# Patient Record
Sex: MALE | Race: WHITE | NOT HISPANIC OR LATINO | Employment: OTHER | URBAN - METROPOLITAN AREA
[De-identification: names, ages, dates, MRNs, and addresses within clinical notes are randomized per-mention and may not be internally consistent; named-entity substitution may affect disease eponyms.]

---

## 2017-02-27 ENCOUNTER — TELEPHONE (OUTPATIENT)
Dept: RHEUMATOLOGY | Facility: CLINIC | Age: 52
End: 2017-02-27

## 2017-02-27 NOTE — TELEPHONE ENCOUNTER
----- Message from Mirza Jordan sent at 2/27/2017  2:02 PM CST -----  Contact: Patient's wife  Patient needs a sooner appointment than the scheduled appointment for 05/05/2017 due to wrist, elbow, knees, and thumbs are swelling and patient can barely move. A knot has recently formed between the patient's thumb and wrist. Please call patient at [phone] to schedule. Thanks!

## 2017-02-27 NOTE — TELEPHONE ENCOUNTER
Spoke with pt wife and informed no sooner appt available at this time. Pt placed on waiting list. Verbalized understanding.

## 2017-05-05 ENCOUNTER — OFFICE VISIT (OUTPATIENT)
Dept: RHEUMATOLOGY | Facility: CLINIC | Age: 52
End: 2017-05-05
Payer: MEDICARE

## 2017-05-05 VITALS
BODY MASS INDEX: 25.94 KG/M2 | HEART RATE: 70 BPM | HEIGHT: 72 IN | SYSTOLIC BLOOD PRESSURE: 139 MMHG | DIASTOLIC BLOOD PRESSURE: 88 MMHG | WEIGHT: 191.5 LBS

## 2017-05-05 DIAGNOSIS — M25.539 PAIN IN WRIST, UNSPECIFIED LATERALITY: Primary | ICD-10-CM

## 2017-05-05 DIAGNOSIS — D64.9 ANEMIA, UNSPECIFIED TYPE: ICD-10-CM

## 2017-05-05 DIAGNOSIS — M25.439 WRIST SWELLING, UNSPECIFIED LATERALITY: ICD-10-CM

## 2017-05-05 DIAGNOSIS — M25.569 KNEE PAIN, UNSPECIFIED CHRONICITY, UNSPECIFIED LATERALITY: ICD-10-CM

## 2017-05-05 PROCEDURE — 1160F RVW MEDS BY RX/DR IN RCRD: CPT | Mod: S$GLB,,, | Performed by: INTERNAL MEDICINE

## 2017-05-05 PROCEDURE — 99999 PR PBB SHADOW E&M-EST. PATIENT-LVL IV: CPT | Mod: PBBFAC,,, | Performed by: INTERNAL MEDICINE

## 2017-05-05 PROCEDURE — 99205 OFFICE O/P NEW HI 60 MIN: CPT | Mod: S$GLB,,, | Performed by: INTERNAL MEDICINE

## 2017-05-05 RX ORDER — MELOXICAM 15 MG/1
15 TABLET ORAL DAILY
Qty: 30 TABLET | Refills: 4 | Status: SHIPPED | OUTPATIENT
Start: 2017-05-05 | End: 2017-09-21 | Stop reason: SDUPTHER

## 2017-05-05 RX ORDER — METHYLPREDNISOLONE 4 MG/1
8 TABLET ORAL DAILY
Qty: 20 TABLET | Refills: 1 | Status: SHIPPED | OUTPATIENT
Start: 2017-05-05 | End: 2017-05-15

## 2017-05-05 NOTE — MR AVS SNAPSHOT
[facility]  [address] [facility] [address]  [facility] [address]  Phone: [phone]  Fax: [phone]                  Lokesh Melchor   2017 9:00 AM   Office Visit    Description:  Male : 1965   Provider:  Jerald Velazquez MD   Department:  [place] - Rheumatology           Reason for Visit     Pain           Diagnoses this Visit        Comments    Pain in wrist, unspecified laterality    -  Primary     Knee pain, unspecified chronicity, unspecified laterality         Wrist swelling, unspecified laterality         Anemia, unspecified type                To Do List           Future Appointments        Provider Department Dept Phone    2017 11:00 AM [facility] [id number] [facility] [phone]    10/12/2017 8:30 AM Jerald Velazquez MD [facility] [phone]      Goals (5 Years of Data)     None      Follow-Up and Disposition     Return in about 3 months (around 2017).       These Medications        Disp Refills Start End    meloxicam (MOBIC) 15 MG tablet 30 tablet 4 2017     Take 1 tablet (15 mg total) by mouth once daily. - Oral    Pharmacy: [facility] [address] [place] [address] AT [facility] [address] & [facility] [address] Ph #: [phone]       methylPREDNISolone (MEDROL) 4 MG Tab 20 tablet 1 2017 5/15/2017    Take 2 tablets (8 mg total) by mouth once daily. - Oral    Pharmacy: [facility] [address] [place] [address] AT [facility] [address] & [facility] [address] Ph #: [phone]         [facility]     [facility] Nurse Care Line -  Assistance  Unless otherwise directed by your provider, please contact [facility], our nurse care line that is available for  assistance.     Registered nurses in the [facility] provide: appointment scheduling, clinical advisement, health education, and other advisory services.  Call: [phone] (toll free)               Medications           Message  regarding Medications     Verify the changes and/or additions to your medication regime listed below are the same as discussed with your clinician today.  If any of these changes or additions are incorrect, please notify your healthcare provider.        START taking these NEW medications        Refills    meloxicam (MOBIC) 15 MG tablet 4    Sig: Take 1 tablet (15 mg total) by mouth once daily.    Class: Normal    Route: Oral    methylPREDNISolone (MEDROL) 4 MG Tab 1    Sig: Take 2 tablets (8 mg total) by mouth once daily.    Class: Normal    Route: Oral      STOP taking these medications     tamsulosin (FLOMAX) 0.4 mg Cp24 Take 1 capsule by mouth once daily.           Verify that the below list of medications is an accurate representation of the medications you are currently taking.  If none reported, the list may be blank. If incorrect, please contact your healthcare provider. Carry this list with you in case of emergency.           Current Medications     AMPHETAMINE SALT COMBO 30 MG tablet Take 1 tablet by mouth once daily.    meloxicam (MOBIC) 15 MG tablet Take 1 tablet (15 mg total) by mouth once daily.    methylPREDNISolone (MEDROL) 4 MG Tab Take 2 tablets (8 mg total) by mouth once daily.           Clinical Reference Information           Your Vitals Were     BP Pulse Height Weight BMI    139/88 70 6' (1.829 m) 86.9 kg (191 lb 8 oz) 25.97 kg/m2      Blood Pressure          Most Recent Value    BP  139/88      Allergies as of 5/5/2017     Codeine      Immunizations Administered on Date of Encounter - 5/5/2017     None      Orders Placed During Today's Visit     Future Labs/Procedures Expected by Expires    Aldolase  5/5/2017 7/4/2018    YESY  5/5/2017 7/4/2018    Anti Sm/RNP Antibody  5/5/2017 7/4/2018    Anti-DNA antibody, double-stranded  5/5/2017 7/4/2018    Anti-Histone Antibody  5/5/2017 7/4/2018    Anti-scleroderma antibody  5/5/2017 7/4/2018    Anti-Smith antibody  5/5/2017 7/4/2018    B. burgdorferi Abs  (Lyme Disease)  5/5/2017 7/4/2018    C-reactive protein  5/5/2017 7/4/2018    Celiac Disease Panel  5/5/2017 7/4/2018    CK  5/5/2017 7/4/2018    HLA B27 Antigen  5/5/2017 7/4/2018    LYME DISEASE WESTERN BLOT  5/5/2017 7/4/2018    METHYLENETETRAHYDROFOL GENOTYPING (C677T/M1981A)  5/5/2017 7/4/2018    Rheumatoid factor  5/5/2017 7/4/2018    Sedimentation rate, manual  5/5/2017 7/4/2018    Sjogrens syndrome-A extractable nuclear antibody  5/5/2017 7/4/2018    Sjogrens syndrome-B extractable nuclear antibody  5/5/2017 7/4/2018    T3, free  5/5/2017 7/4/2018    T4, free  5/5/2017 7/4/2018    TSH  5/5/2017 7/4/2018    X-Ray Hand Complete Bilateral  5/5/2017 5/5/2018    X-Ray Hand Complete Bilateral  5/5/2017 5/5/2018    X-Ray Wrist Complete Bilateral  5/5/2017 5/5/2018    X-Ray Wrist Complete Bilateral  5/5/2017 5/5/2018      [facility] Sign-Up     Activating your [facility] account is as easy as 1-2-3!     1) Visit [email or web address], select Sign Up Now, enter this activation code and your date of birth, then select Next.  [email or web address]  Expires: 6/19/2017 10:55 AM      2) Create a username and password to use when you visit [facility] in the future and select a security question in case you lose your password and select Next.    3) Enter your e-mail address and click Sign Up!    Additional Information  If you have questions, please e-mail [email or web address] or call [phone] to talk to our [facility] staff. Remember, [facility] is NOT to be used for urgent needs. For medical emergencies, dial 911.         Language Assistance Services     ATTENTION: Language assistance services are available, free of charge. Please call [phone].      ATENCIÓN: Si habla español, tiene a lux disposición servicios gratuitos de asistencia lingüística. Llame al [phone].     BETTINA Ý: N?u b?n nói Ti?ng Vi?t, có các d?ch v? h? tr? ngôn ng? mi?n phí dành cho b?n. G?i s? [phone].         [facility] complies with  applicable Federal civil rights laws and does not discriminate on the basis of race, color, national origin, age, disability, or sex.

## 2017-05-05 NOTE — PROGRESS NOTES
Subjective:       Patient ID: Lokesh Melchor is a 52 y.o. male.    Chief Complaint: Pain (pain in wrists)    HPI Comments: hpi he had a accident hurt his back x was treated  Steroid injections and pain meds, presently on low dose suboxone,  Wrist pain and knee pain had  Nodule removed, now x 1 year has joints.          Arthritis    The disease course has been fluctuating.     He complains of pain, stiffness, joint swelling and joint warmth. The symptoms have been worsening. Affected locations include the neck, right wrist, right MCP, right PIP, right DIP, left wrist, left MCP, left PIP and left DIP. Associated symptoms include fatigue, pain at night and pain while resting. Pertinent negatives include no dysuria, fever, Raynaud's syndrome, uveitis, trouble swallowing, myalgias, dry eyes, pleurisy, headaches, jaw pain, facial pain or weight loss. He complains of morning stiffness.The neck, left wrist, right wrist, right knee, left knee, left hand and right hand presents with Arthralgia.    Factors aggravating his arthritis include activity.     Review of Systems   Constitutional: Positive for fatigue. Negative for activity change, appetite change, chills, diaphoresis, fever, unexpected weight change and weight loss.   HENT: Negative for congestion, ear pain, facial swelling, mouth sores, nosebleeds, postnasal drip, rhinorrhea, sinus pressure, sneezing, sore throat, tinnitus, trouble swallowing and voice change.    Eyes: Negative for pain, discharge, redness, itching and visual disturbance.   Respiratory: Negative for apnea, cough, chest tightness, shortness of breath and wheezing.    Cardiovascular: Negative for chest pain, palpitations and leg swelling.   Gastrointestinal: Negative for abdominal pain, constipation, diarrhea, nausea and vomiting.   Endocrine: Negative for cold intolerance, heat intolerance, polydipsia and polyuria.   Genitourinary: Negative for decreased urine volume, difficulty urinating,  dysuria, flank pain, frequency, hematuria and urgency.   Musculoskeletal: Positive for back pain, gait problem, joint swelling, neck pain, neck stiffness and stiffness. Negative for arthralgias and myalgias.   Skin: Positive for rash. Negative for pallor and wound.   Allergic/Immunologic: Negative for immunocompromised state.   Neurological: Negative for dizziness, tremors, seizures, syncope, weakness, numbness and headaches.   Hematological: Negative for adenopathy. Does not bruise/bleed easily.   Psychiatric/Behavioral: Negative for sleep disturbance and suicidal ideas. The patient is not nervous/anxious.          Objective:   /88  Pulse 70  Ht 6' (1.829 m)  Wt 86.9 kg (191 lb 8 oz)  BMI 25.97 kg/m2     Physical Exam   Vitals reviewed.  Constitutional: He is oriented to person, place, and time and well-developed, well-nourished, and in no distress.   HENT:   Head: Normocephalic and atraumatic.   Mouth/Throat: Oropharynx is clear and moist.   Eyes: EOM are normal. Pupils are equal, round, and reactive to light.   Neck: Neck supple. No thyromegaly present.   Cardiovascular: Normal rate, regular rhythm and normal heart sounds.  Exam reveals no gallop and no friction rub.    No murmur heard.  Pulmonary/Chest: Breath sounds normal. He has no wheezes. He has no rales. He exhibits no tenderness.   Abdominal: There is no tenderness. There is no rebound and no guarding.       Right Side Rheumatological Exam     Examination finds the shoulder, elbow, wrist, knee, 1st PIP, 1st MCP, 2nd PIP, 2nd MCP, 3rd PIP, 3rd MCP, 4th PIP, 4th MCP, 5th PIP and 5th MCP normal.    Shoulder Exam   Tenderness Location: no tenderness    Range of Motion   Active Abduction: abnormal   Adduction: abnormal  Effusion: negative  Sensation: normal    Knee Exam   Patellofemoral Crepitus: negative  Effusion: negative  Range of Motion: normal range of motion  Sensation: normal    Hip Exam   Tenderness Location: no tenderness  Sensation:  normal    Elbow/Wrist Exam   Tenderness Location: no tenderness  Sensation: normal    Left Side Rheumatological Exam     Examination finds the shoulder, elbow, wrist, knee, 1st PIP, 1st MCP, 2nd PIP, 2nd MCP, 3rd PIP, 3rd MCP, 4th PIP, 4th MCP, 5th PIP and 5th MCP normal.    Shoulder Exam   Tenderness Location: no tenderness    Range of Motion   Active Abduction: abnormal   Sensation: normal    Knee Exam     Patellofemoral Crepitus: negative  Effusion: negative  Range of Motion: decreased range of motion  Sensation: normal    Hip Exam   Tenderness Location: no tenderness  Sensation: normal    Elbow/Wrist Exam   Sensation: normal      Back/Neck Exam   General Inspection   Gait: normal         Lymphadenopathy:     He has no cervical adenopathy.   Neurological: He is alert and oriented to person, place, and time. Gait normal.   Skin: No rash noted. No erythema. No pallor.     Psychiatric: Mood and affect normal.   Musculoskeletal: He exhibits tenderness and deformity.   oa and mcp, changes           Assessment:       1. Pain in wrist, unspecified laterality    2. Knee pain, unspecified chronicity, unspecified laterality    3. Wrist swelling, unspecified laterality    4. Anemia, unspecified type            Plan:       Lokesh was seen today for pain.    Diagnoses and all orders for this visit:    Pain in wrist, unspecified laterality  -     YESY; Future  -     Aldolase; Future  -     Anti Sm/RNP Antibody; Future  -     Anti-DNA antibody, double-stranded; Future  -     Anti-Histone Antibody; Future  -     Anti-scleroderma antibody; Future  -     Anti-Smith antibody; Future  -     Sjogrens syndrome-A extractable nuclear antibody; Future  -     Sjogrens syndrome-B extractable nuclear antibody; Future  -     C-reactive protein; Future  -     CK; Future  -     TSH; Future  -     T4, free; Future  -     T3, free; Future  -     Sedimentation rate, manual; Future  -     Rheumatoid factor; Future  -     HLA B27 Antigen; Future  -      LYME DISEASE WESTERN BLOT; Future  -     B. burgdorferi Abs (Lyme Disease); Future  -     Celiac Disease Panel; Future  -     X-Ray Hand Complete Bilateral; Future  -     X-Ray Wrist Complete Bilateral; Future  -     YESY  -     Aldolase  -     Anti Sm/RNP Antibody  -     Anti-DNA antibody, double-stranded  -     Anti-Histone Antibody  -     Anti-scleroderma antibody  -     Anti-Smith antibody  -     Sjogrens syndrome-A extractable nuclear antibody  -     Sjogrens syndrome-B extractable nuclear antibody  -     C-reactive protein  -     CK  -     TSH  -     T4, free  -     T3, free  -     Sedimentation rate, manual  -     Rheumatoid factor  -     HLA B27 Antigen  -     LYME DISEASE WESTERN BLOT  -     B. burgdorferi Abs (Lyme Disease)  -     Celiac Disease Panel  -     X-Ray Hand Complete Bilateral  -     X-Ray Wrist Complete Bilateral  -     meloxicam (MOBIC) 15 MG tablet; Take 1 tablet (15 mg total) by mouth once daily.  -     methylPREDNISolone (MEDROL) 4 MG Tab; Take 2 tablets (8 mg total) by mouth once daily.  -     METHYLENETETRAHYDROFOL GENOTYPING (C677T/Q9839Y); Future  -     X-Ray Wrist Complete Bilateral; Future  -     X-Ray Hand Complete Bilateral; Future    Knee pain, unspecified chronicity, unspecified laterality  -     YESY; Future  -     Aldolase; Future  -     Anti Sm/RNP Antibody; Future  -     Anti-DNA antibody, double-stranded; Future  -     Anti-Histone Antibody; Future  -     Anti-scleroderma antibody; Future  -     Anti-Smith antibody; Future  -     Sjogrens syndrome-A extractable nuclear antibody; Future  -     Sjogrens syndrome-B extractable nuclear antibody; Future  -     C-reactive protein; Future  -     CK; Future  -     TSH; Future  -     T4, free; Future  -     T3, free; Future  -     Sedimentation rate, manual; Future  -     Rheumatoid factor; Future  -     HLA B27 Antigen; Future  -     LYME DISEASE WESTERN BLOT; Future  -     B. burgdorferi Abs (Lyme Disease); Future  -     Celiac  Disease Panel; Future  -     X-Ray Hand Complete Bilateral; Future  -     X-Ray Wrist Complete Bilateral; Future  -     YESY  -     Aldolase  -     Anti Sm/RNP Antibody  -     Anti-DNA antibody, double-stranded  -     Anti-Histone Antibody  -     Anti-scleroderma antibody  -     Anti-Smith antibody  -     Sjogrens syndrome-A extractable nuclear antibody  -     Sjogrens syndrome-B extractable nuclear antibody  -     C-reactive protein  -     CK  -     TSH  -     T4, free  -     T3, free  -     Sedimentation rate, manual  -     Rheumatoid factor  -     HLA B27 Antigen  -     LYME DISEASE WESTERN BLOT  -     B. burgdorferi Abs (Lyme Disease)  -     Celiac Disease Panel  -     X-Ray Hand Complete Bilateral  -     X-Ray Wrist Complete Bilateral  -     meloxicam (MOBIC) 15 MG tablet; Take 1 tablet (15 mg total) by mouth once daily.  -     methylPREDNISolone (MEDROL) 4 MG Tab; Take 2 tablets (8 mg total) by mouth once daily.  -     METHYLENETETRAHYDROFOL GENOTYPING (C677T/N8070Y); Future  -     X-Ray Wrist Complete Bilateral; Future  -     X-Ray Hand Complete Bilateral; Future    Wrist swelling, unspecified laterality  -     YESY; Future  -     Aldolase; Future  -     Anti Sm/RNP Antibody; Future  -     Anti-DNA antibody, double-stranded; Future  -     Anti-Histone Antibody; Future  -     Anti-scleroderma antibody; Future  -     Anti-Smith antibody; Future  -     Sjogrens syndrome-A extractable nuclear antibody; Future  -     Sjogrens syndrome-B extractable nuclear antibody; Future  -     C-reactive protein; Future  -     CK; Future  -     TSH; Future  -     T4, free; Future  -     T3, free; Future  -     Sedimentation rate, manual; Future  -     Rheumatoid factor; Future  -     HLA B27 Antigen; Future  -     LYME DISEASE WESTERN BLOT; Future  -     B. burgdorferi Abs (Lyme Disease); Future  -     Celiac Disease Panel; Future  -     X-Ray Hand Complete Bilateral; Future  -     X-Ray Wrist Complete Bilateral; Future  -      YESY  -     Aldolase  -     Anti Sm/RNP Antibody  -     Anti-DNA antibody, double-stranded  -     Anti-Histone Antibody  -     Anti-scleroderma antibody  -     Anti-Smith antibody  -     Sjogrens syndrome-A extractable nuclear antibody  -     Sjogrens syndrome-B extractable nuclear antibody  -     C-reactive protein  -     CK  -     TSH  -     T4, free  -     T3, free  -     Sedimentation rate, manual  -     Rheumatoid factor  -     HLA B27 Antigen  -     LYME DISEASE WESTERN BLOT  -     B. burgdorferi Abs (Lyme Disease)  -     Celiac Disease Panel  -     X-Ray Hand Complete Bilateral  -     X-Ray Wrist Complete Bilateral  -     meloxicam (MOBIC) 15 MG tablet; Take 1 tablet (15 mg total) by mouth once daily.  -     methylPREDNISolone (MEDROL) 4 MG Tab; Take 2 tablets (8 mg total) by mouth once daily.  -     METHYLENETETRAHYDROFOL GENOTYPING (C677T/A3578E); Future  -     X-Ray Wrist Complete Bilateral; Future  -     X-Ray Hand Complete Bilateral; Future    Anemia, unspecified type  -     METHYLENETETRAHYDROFOL GENOTYPING (C677T/C3526Z); Future  -     X-Ray Wrist Complete Bilateral; Future  -     X-Ray Hand Complete Bilateral; Future

## 2017-07-27 NOTE — TELEPHONE ENCOUNTER
Spoke with wife regarding lab results. Informed Dr Velazquez has reviewed labs results. Advised to start methylfolate 1000 mg daily, plaquenil 200 mg bid, informed labs showed negative for lyme, positive for mild RA. Wife was able to repeat back instructions given. Verbalized understanding.

## 2017-07-28 RX ORDER — HYDROXYCHLOROQUINE SULFATE 200 MG/1
200 TABLET, FILM COATED ORAL 2 TIMES DAILY
Qty: 60 TABLET | Refills: 4 | Status: SHIPPED | OUTPATIENT
Start: 2017-07-28 | End: 2017-10-12 | Stop reason: SDUPTHER

## 2017-09-21 DIAGNOSIS — M25.539 PAIN IN WRIST, UNSPECIFIED LATERALITY: ICD-10-CM

## 2017-09-21 DIAGNOSIS — M25.569 KNEE PAIN, UNSPECIFIED CHRONICITY, UNSPECIFIED LATERALITY: ICD-10-CM

## 2017-09-21 DIAGNOSIS — M25.439 WRIST SWELLING, UNSPECIFIED LATERALITY: ICD-10-CM

## 2017-09-21 RX ORDER — MELOXICAM 15 MG/1
TABLET ORAL
Qty: 30 TABLET | Refills: 3 | Status: SHIPPED | OUTPATIENT
Start: 2017-09-21 | End: 2018-08-21

## 2017-09-21 RX ORDER — MELOXICAM 15 MG/1
TABLET ORAL
Qty: 30 TABLET | Refills: 0 | Status: SHIPPED | OUTPATIENT
Start: 2017-09-21 | End: 2017-09-21 | Stop reason: SDUPTHER

## 2017-10-12 ENCOUNTER — TELEPHONE (OUTPATIENT)
Dept: RHEUMATOLOGY | Facility: CLINIC | Age: 52
End: 2017-10-12

## 2017-10-12 ENCOUNTER — OFFICE VISIT (OUTPATIENT)
Dept: RHEUMATOLOGY | Facility: CLINIC | Age: 52
End: 2017-10-12
Payer: MEDICARE

## 2017-10-12 ENCOUNTER — TELEPHONE (OUTPATIENT)
Dept: UROLOGY | Facility: CLINIC | Age: 52
End: 2017-10-12

## 2017-10-12 VITALS
WEIGHT: 190.06 LBS | HEART RATE: 70 BPM | SYSTOLIC BLOOD PRESSURE: 134 MMHG | DIASTOLIC BLOOD PRESSURE: 77 MMHG | BODY MASS INDEX: 25.77 KG/M2

## 2017-10-12 DIAGNOSIS — R30.0 DYSURIA: ICD-10-CM

## 2017-10-12 DIAGNOSIS — M25.539 PAIN IN WRIST, UNSPECIFIED LATERALITY: Primary | ICD-10-CM

## 2017-10-12 DIAGNOSIS — M06.9 RHEUMATOID ARTHRITIS, INVOLVING UNSPECIFIED SITE, UNSPECIFIED RHEUMATOID FACTOR PRESENCE: ICD-10-CM

## 2017-10-12 PROCEDURE — 96372 THER/PROPH/DIAG INJ SC/IM: CPT | Mod: S$GLB,,, | Performed by: INTERNAL MEDICINE

## 2017-10-12 PROCEDURE — 99999 PR PBB SHADOW E&M-EST. PATIENT-LVL III: CPT | Mod: PBBFAC,,, | Performed by: INTERNAL MEDICINE

## 2017-10-12 PROCEDURE — 99214 OFFICE O/P EST MOD 30 MIN: CPT | Mod: 25,S$GLB,, | Performed by: INTERNAL MEDICINE

## 2017-10-12 RX ORDER — CYANOCOBALAMIN 1000 UG/ML
1000 INJECTION, SOLUTION INTRAMUSCULAR; SUBCUTANEOUS
Status: COMPLETED | OUTPATIENT
Start: 2017-10-12 | End: 2017-10-12

## 2017-10-12 RX ORDER — KETOROLAC TROMETHAMINE 30 MG/ML
60 INJECTION, SOLUTION INTRAMUSCULAR; INTRAVENOUS
Status: COMPLETED | OUTPATIENT
Start: 2017-10-12 | End: 2017-10-12

## 2017-10-12 RX ORDER — DEXTROAMPHETAMINE SACCHARATE, AMPHETAMINE ASPARTATE MONOHYDRATE, DEXTROAMPHETAMINE SULFATE AND AMPHETAMINE SULFATE 5; 5; 5; 5 MG/1; MG/1; MG/1; MG/1
20 CAPSULE, EXTENDED RELEASE ORAL EVERY MORNING
COMMUNITY
End: 2018-08-21

## 2017-10-12 RX ORDER — FLUCONAZOLE 150 MG/1
150 TABLET ORAL DAILY
Qty: 3 TABLET | Refills: 3 | Status: SHIPPED | OUTPATIENT
Start: 2017-10-12 | End: 2017-10-15

## 2017-10-12 RX ORDER — METHYLPREDNISOLONE ACETATE 80 MG/ML
160 INJECTION, SUSPENSION INTRA-ARTICULAR; INTRALESIONAL; INTRAMUSCULAR; SOFT TISSUE
Status: COMPLETED | OUTPATIENT
Start: 2017-10-12 | End: 2017-10-12

## 2017-10-12 RX ORDER — METHYLPREDNISOLONE 4 MG/1
4 TABLET ORAL DAILY
Qty: 30 TABLET | Refills: 4 | Status: SHIPPED | OUTPATIENT
Start: 2017-10-12 | End: 2018-08-21 | Stop reason: SDUPTHER

## 2017-10-12 RX ORDER — HYDROXYCHLOROQUINE SULFATE 200 MG/1
200 TABLET, FILM COATED ORAL 2 TIMES DAILY
Qty: 60 TABLET | Refills: 6 | Status: SHIPPED | OUTPATIENT
Start: 2017-10-12 | End: 2018-08-21

## 2017-10-12 RX ORDER — LEVOFLOXACIN 500 MG/1
500 TABLET, FILM COATED ORAL DAILY
Qty: 10 TABLET | Refills: 0 | Status: SHIPPED | OUTPATIENT
Start: 2017-10-12 | End: 2019-07-14

## 2017-10-12 RX ORDER — BUPRENORPHINE HYDROCHLORIDE, NALOXONE HYDROCHLORIDE 4; 1 MG/1; MG/1
1 FILM, SOLUBLE BUCCAL; SUBLINGUAL DAILY
COMMUNITY
Start: 2017-10-02

## 2017-10-12 RX ADMIN — CYANOCOBALAMIN 1000 MCG: 1000 INJECTION, SOLUTION INTRAMUSCULAR; SUBCUTANEOUS at 10:10

## 2017-10-12 RX ADMIN — KETOROLAC TROMETHAMINE 60 MG: 30 INJECTION, SOLUTION INTRAMUSCULAR; INTRAVENOUS at 10:10

## 2017-10-12 RX ADMIN — METHYLPREDNISOLONE ACETATE 160 MG: 80 INJECTION, SUSPENSION INTRA-ARTICULAR; INTRALESIONAL; INTRAMUSCULAR; SOFT TISSUE at 10:10

## 2017-10-12 ASSESSMENT — ROUTINE ASSESSMENT OF PATIENT INDEX DATA (RAPID3)
PSYCHOLOGICAL DISTRESS SCORE: 0
MDHAQ FUNCTION SCORE: 1
PATIENT GLOBAL ASSESSMENT SCORE: 1
AM STIFFNESS SCORE: 0, NO
PAIN SCORE: 4
FATIGUE SCORE: 0
TOTAL RAPID3 SCORE: 2.78

## 2017-10-12 NOTE — PROGRESS NOTES
Subjective:       Patient ID: Lokesh Melchor is a 52 y.o. male.    Chief Complaint: Follow-up    Follow up:  Newly dx RA on plaquenil and mobic. Patient complains of arthralgias and myalgias for which has been present for a few years. Pain is located in multiple joints, both shoulder(s), both elbow(s), both wrist(s), both MCP(s): 1st, 2nd, 3rd, 4th and 5th, both PIP(s): 1st, 2nd, 3rd, 4th and 5th, both DIP(s): 1st and 2nd, both hip(s), both knee(s) and both MTP(s): 1st, 2nd, 3rd, 4th and 5th, is described as aching, pulsating, shooting and throbbing, and is constant, moderate .  Associated symptoms include: crepitation, decreased range of motion, edema, effusion, tenderness and warmth.       Review of Systems   Constitutional: Negative for activity change, appetite change, chills, diaphoresis and unexpected weight change.   HENT: Negative for congestion, ear pain, facial swelling, mouth sores, nosebleeds, postnasal drip, rhinorrhea, sinus pressure, sneezing, sore throat, tinnitus and voice change.    Eyes: Negative for pain, discharge, redness, itching and visual disturbance.   Respiratory: Negative for apnea, cough, chest tightness, shortness of breath and wheezing.    Cardiovascular: Negative for chest pain, palpitations and leg swelling.   Gastrointestinal: Negative for abdominal pain, constipation, diarrhea, nausea and vomiting.   Endocrine: Negative for cold intolerance, heat intolerance, polydipsia and polyuria.   Genitourinary: Negative for decreased urine volume, difficulty urinating, flank pain, frequency, hematuria and urgency.   Musculoskeletal: Positive for back pain, gait problem, neck pain and neck stiffness. Negative for arthralgias.   Skin: Positive for rash. Negative for pallor and wound.   Allergic/Immunologic: Negative for immunocompromised state.   Neurological: Negative for dizziness, tremors, seizures, syncope, weakness and numbness.   Hematological: Negative for adenopathy. Does not  bruise/bleed easily.   Psychiatric/Behavioral: Negative for sleep disturbance and suicidal ideas. The patient is not nervous/anxious.          Objective:   /77 (BP Location: Right arm, Patient Position: Sitting, BP Method: Medium (Automatic))   Pulse 70   Wt 86.2 kg (190 lb 0.6 oz)   BMI 25.77 kg/m²      Physical Exam   Vitals reviewed.  Constitutional: He is oriented to person, place, and time and well-developed, well-nourished, and in no distress.   HENT:   Head: Normocephalic and atraumatic.   Mouth/Throat: Oropharynx is clear and moist.   Eyes: EOM are normal. Pupils are equal, round, and reactive to light.   Neck: Neck supple. No thyromegaly present.   Cardiovascular: Normal rate, regular rhythm and normal heart sounds.  Exam reveals no gallop and no friction rub.    No murmur heard.  Pulmonary/Chest: Breath sounds normal. He has no wheezes. He has no rales. He exhibits no tenderness.   Abdominal: There is no tenderness. There is no rebound and no guarding.       Right Side Rheumatological Exam     Examination finds the shoulder, elbow, wrist, knee, 1st PIP, 1st MCP, 2nd PIP, 2nd MCP, 3rd PIP, 3rd MCP, 4th PIP, 4th MCP, 5th PIP and 5th MCP normal.    Shoulder Exam   Tenderness Location: no tenderness    Range of Motion   Active Abduction: abnormal   Adduction: abnormal  Sensation: normal    Knee Exam   Patellofemoral Crepitus: negative  Effusion: negative  Sensation: normal    Hip Exam   Tenderness Location: no tenderness  Sensation: normal    Elbow/Wrist Exam   Tenderness Location: no tenderness  Sensation: normal    Left Side Rheumatological Exam     Examination finds the shoulder, elbow, wrist, knee, 1st PIP, 1st MCP, 2nd PIP, 2nd MCP, 3rd PIP, 3rd MCP, 4th PIP, 4th MCP, 5th PIP and 5th MCP normal.    Shoulder Exam   Tenderness Location: no tenderness    Range of Motion   Active Abduction: abnormal   Sensation: normal    Knee Exam     Patellofemoral Crepitus: negative  Effusion: negative  Sensation:  normal    Hip Exam   Tenderness Location: no tenderness  Sensation: normal    Elbow/Wrist Exam   Sensation: normal      Back/Neck Exam   General Inspection   Gait: normal         Lymphadenopathy:     He has no cervical adenopathy.   Neurological: He is alert and oriented to person, place, and time. Gait normal.   Skin: No rash noted. No erythema. No pallor.     Psychiatric: Mood and affect normal.   Musculoskeletal: He exhibits tenderness and deformity.   oa and mcp, changes         ra 25 mthfr  heterozogy   Assessment:       1. Pain in wrist, unspecified laterality    2. Rheumatoid arthritis, involving unspecified site, unspecified rheumatoid factor presence    3. Dysuria            Plan:       Lokesh was seen today for follow-up.    Diagnoses and all orders for this visit:    Pain in wrist, unspecified laterality  -     Urinalysis; Future  -     Urine culture; Future  -     Urinalysis  -     Urine culture  -     Ambulatory consult to Urology  -     levoFLOXacin (LEVAQUIN) 500 MG tablet; Take 1 tablet (500 mg total) by mouth once daily.  -     PSA, SCREENING; Future  -     fluconazole (DIFLUCAN) 150 MG Tab; Take 1 tablet (150 mg total) by mouth once daily.  -     PSA, SCREENING  -     Rheumatoid factor; Future  -     Cyclic citrul peptide antibody, IgG; Future  -     Rheumatoid factor  -     Cyclic citrul peptide antibody, IgG    Rheumatoid arthritis, involving unspecified site, unspecified rheumatoid factor presence  -     Urinalysis; Future  -     Urine culture; Future  -     Urinalysis  -     Urine culture  -     Ambulatory consult to Urology  -     levoFLOXacin (LEVAQUIN) 500 MG tablet; Take 1 tablet (500 mg total) by mouth once daily.  -     PSA, SCREENING; Future  -     fluconazole (DIFLUCAN) 150 MG Tab; Take 1 tablet (150 mg total) by mouth once daily.  -     PSA, SCREENING  -     Rheumatoid factor; Future  -     Cyclic citrul peptide antibody, IgG; Future  -     Rheumatoid factor  -     Cyclic citrul peptide  antibody, IgG    Dysuria  -     Urinalysis; Future  -     Urine culture; Future  -     Urinalysis  -     Urine culture  -     Ambulatory consult to Urology  -     levoFLOXacin (LEVAQUIN) 500 MG tablet; Take 1 tablet (500 mg total) by mouth once daily.  -     PSA, SCREENING; Future  -     fluconazole (DIFLUCAN) 150 MG Tab; Take 1 tablet (150 mg total) by mouth once daily.  -     PSA, SCREENING  -     Rheumatoid factor; Future  -     Cyclic citrul peptide antibody, IgG; Future  -     Rheumatoid factor  -     Cyclic citrul peptide antibody, IgG    Other orders  -     methylPREDNISolone (MEDROL) 4 MG Tab; Take 1 tablet (4 mg total) by mouth once daily.  -     hydroxychloroquine (PLAQUENIL) 200 mg tablet; Take 1 tablet (200 mg total) by mouth 2 (two) times daily.

## 2017-10-12 NOTE — PROGRESS NOTES
Administered 1 cc ( 1000 mcg/ml ) of b12 to the right upper outer gluteal. Informed of s/s to report verbalized understanding. No adverse reactions noted.    Lot # [id number]  Expiration sep 18    Administered 2 cc ( 80 mg/ml ) of depomedrol to the right upper outer gluteal. Informed of s/s to report verbalized understanding. No adverse reactions noted.    Lot # [id number]  Expiration 08/2018    Administered 2 cc ( 30 mg/ml ) of toradol to the left upper outer gluteal. Informed of s/s to report verbalized understanding. No adverse reactions noted.    Lot # [id number]  Expiration 1 jun 2019

## 2017-11-09 ENCOUNTER — TELEPHONE (OUTPATIENT)
Dept: RHEUMATOLOGY | Facility: CLINIC | Age: 52
End: 2017-11-09

## 2017-11-09 NOTE — TELEPHONE ENCOUNTER
Returned wife call. Patient needing nurse injections due to pain in sciatica. Nurse spoke with Dr Velazquez regarding injections due to patient received injections on 10-12-17. Dr Velazquez would not approved nurse injections due to being to soon and patient on 4 mg methyl prednisone daily. Nurse informed wife that Dr Velazquez would not approve shots due to being to soon and shots would thin his bones. Dr Velazquez will change mobic 15 mg to 800 mg ibuprofen three times a day and refer to pain management for possible injection. Wife stated she would talk with patient and contact office if patient decides to change medication and go to pain management.

## 2017-11-09 NOTE — TELEPHONE ENCOUNTER
----- Message from Calista Tineo sent at 11/9/2017  8:43 AM CST -----  Contact: wife-beatriz galindo  Would like patient to get an injection tomorrow, if possible. Please call back at [phone] (home)

## 2017-11-10 ENCOUNTER — OFFICE VISIT (OUTPATIENT)
Dept: UROLOGY | Facility: CLINIC | Age: 52
End: 2017-11-10
Payer: MEDICARE

## 2017-11-10 VITALS
HEIGHT: 72 IN | BODY MASS INDEX: 25.35 KG/M2 | DIASTOLIC BLOOD PRESSURE: 83 MMHG | SYSTOLIC BLOOD PRESSURE: 148 MMHG | HEART RATE: 72 BPM | WEIGHT: 187.19 LBS

## 2017-11-10 DIAGNOSIS — R30.0 DYSURIA: Primary | ICD-10-CM

## 2017-11-10 DIAGNOSIS — Z87.438 H/O EPIDIDYMITIS: ICD-10-CM

## 2017-11-10 DIAGNOSIS — N50.82 SCROTAL PAIN: ICD-10-CM

## 2017-11-10 LAB
BILIRUB SERPL-MCNC: NORMAL MG/DL
BLOOD URINE, POC: NORMAL
COLOR, POC UA: YELLOW
GLUCOSE UR QL STRIP: NORMAL
KETONES UR QL STRIP: NORMAL
LEUKOCYTE ESTERASE URINE, POC: NORMAL
NITRITE, POC UA: NORMAL
PH, POC UA: 6
PROTEIN, POC: NORMAL
SPECIFIC GRAVITY, POC UA: 1.01
UROBILINOGEN, POC UA: NORMAL

## 2017-11-10 PROCEDURE — 99999 PR PBB SHADOW E&M-EST. PATIENT-LVL III: CPT | Mod: PBBFAC,,, | Performed by: UROLOGY

## 2017-11-10 PROCEDURE — 81002 URINALYSIS NONAUTO W/O SCOPE: CPT | Mod: S$GLB,,, | Performed by: UROLOGY

## 2017-11-10 PROCEDURE — 99204 OFFICE O/P NEW MOD 45 MIN: CPT | Mod: 25,S$GLB,, | Performed by: UROLOGY

## 2017-11-10 RX ORDER — DEXTROAMPHETAMINE SACCHARATE, AMPHETAMINE ASPARTATE, DEXTROAMPHETAMINE SULFATE AND AMPHETAMINE SULFATE 7.5; 7.5; 7.5; 7.5 MG/1; MG/1; MG/1; MG/1
TABLET ORAL
Refills: 0 | COMMUNITY
Start: 2017-10-19 | End: 2017-11-10

## 2017-11-10 NOTE — PROGRESS NOTES
"UROLOGY [place] [id number] 10 17    Urinalysis: col yellow, sg 10, pH 6, leuco -, nitrites -, prot -, glucose -, bili -, blood -    c-c frequent uti    Age 52, accompanied by wife. Pt says he had an orchiepididymitis five years ago and needed admission for two days at [facility], and that the urine culture grew e coli, which he calls "feces in his urine". Since then he fears having other uti's, and says his wife has been getting them often and is now on antibiotic prophylaxis with macrodantin. Today, for example, pt feels he might be infected, but the urinalysis shows a completely normal urinary sediment.     He also says lately his orgasm 'feels different'. His voiding stream is decreased in strength. No nocturia, urinary frequency or urgency.     Says is having a point of tenderness in the scrotum      PMH    Surgical:  has a past surgical history that includes Knee surgery. circumcision    Medical: uti, sciatic, rheumatoid arthritis    Familial: no fh of renal disease    [place]    Current Outpatient Prescriptions on File Prior to Visit   Medication Sig Dispense Refill    dextroamphetamine-amphetamine (ADDERALL XR) 20 MG 24 hr capsule Take 20 mg by mouth every morning.      hydroxychloroquine (PLAQUENIL) 200 mg tablet Take 1 tablet (200 mg total) by mouth 2 (two) ti 60 tablet 6    levoFLOXacin (LEVAQUIN) 500 MG tablet Take 1 tablet (500 mg total) by mouth once daily. 10 tablet 0    meloxicam (MOBIC) 15 MG tablet TAKE 1 TABLET(15 MG) BY MOUTH EVERY DAY 30 tablet 3    methylPREDNISolone (MEDROL) 4 MG Tab Take 1 tablet (4 mg total) by mouth once daily. 30 tablet 4    SUBOXONE 4-1 mg Film Place 2 mg inside cheek       REVIEW OF SYSTEMS  GENERAL: No headaches or dizzy spells.   HEENT: vision preserved. Sinuses: No complaints.   CARDIOPULMONARY: No swelling of the legs; no chest pain. No shortness of breath, no wheezing.   GASTROINTESTINAL: No heartburn. Denies diarrhea; denies " constipation, no blood or mucus in stools.   GENITOURINARY: has slow stream, abnormal ejaculation   MUSCULOSKELETAL: has arthritic complaints such as rheumatoid type   PSYCHIATRIC: has history of anxiety.   ENDOCRINOLOGIC: No reports of sweating, cold or heat intolerance. No polyuria or polydipsia.   NEUROLOGICAL: No headache, dizziness, syncope, paralysis  LYMPHATICS: No enlarged nodes. No history of splenectomy.  ==    Pt alert, oriented, cooperative, no distress  HEENT: wnl.  Neck: supple, no JVD, no lymphadenopathy  Chest: CV NSR  Lungs: normal auscultation  Abdomen flat, athletic, nontender, no organomegaly, no masses.  No hernias  Penis circumcised, meatus nl  Testes nl, epi nl, scrotum nl, with no areas of tenderness detected  LEILA: anus nl, sphincter nl tone, mucosa without lesions, prostate 20 gm, symmetric, no nodules or indurations  Extremities: no edema, peripheral pulses nl  Neuro: preserved    IMP  Hx of epididymitis, currently no signs of recurrence  Functional complaints  Scrotal pain, not reproduced today.

## 2017-11-10 NOTE — LETTER
November 10, 2017      Jerald Velazquez MD  [address] [facility] [address] [place] [address]           [facility]  [address] [facility] [address] [place] [address]  Phone: [phone]          Patient: Lokesh Melchor   MR Number: [id number]   YOB: 1965   Date of Visit: 11/10/2017       Dear Dr. Jerald Velazquez:    Thank you for referring Lokesh Melchor to me for evaluation. Attached you will find relevant portions of my assessment and plan of care.    If you have questions, please do not hesitate to call me. I look forward to following Lokesh Melchor along with you.    Sincerely,    Sergio Mayorga MD    Enclosure  CC:  No Recipients    If you would like to receive this communication electronically, please contact [email or web address] or [phone] to request more information on [facility] access.    For providers and/or their staff who would like to refer a patient to [facility], please contact us through our one-stop-shop provider referral line, [facility], at [phone].    If you feel you have received this communication in error or would no longer like to receive these types of communications, please e-mail [email or web address]

## 2017-11-16 ENCOUNTER — TELEPHONE (OUTPATIENT)
Dept: RHEUMATOLOGY | Facility: CLINIC | Age: 52
End: 2017-11-16

## 2017-11-16 NOTE — TELEPHONE ENCOUNTER
----- Message from Jasper Bravo sent at 11/15/2017  4:17 PM CST -----  Contact: self   Patient want to speak with a nurse regarding a referral that is needed. Please call back at [phone] (home)

## 2017-11-29 ENCOUNTER — TELEPHONE (OUTPATIENT)
Dept: NEUROSURGERY | Facility: CLINIC | Age: 52
End: 2017-11-29

## 2017-11-29 NOTE — TELEPHONE ENCOUNTER
----- Message from Sindy Jenkins sent at 11/28/2017 10:18 AM CST -----  Contact: Wife- Amber [phone]  Patient's wife called asking to schedule an appointment for the above listed patient. Thanks!

## 2017-12-01 NOTE — TELEPHONE ENCOUNTER
Pt's wife stated they found a neurosurgeon elsewhere that was able to get him in soon. Not interested in coming at this time.

## 2018-08-08 ENCOUNTER — TELEPHONE (OUTPATIENT)
Dept: RHEUMATOLOGY | Facility: CLINIC | Age: 53
End: 2018-08-08

## 2018-08-08 NOTE — TELEPHONE ENCOUNTER
----- Message from Lexus Ng sent at 8/8/2018  8:29 AM CDT -----  Contact: Patient's wife, Amber  Type: Needs Medical Advice    Who Called:  Patient's wife  Symptoms (please be specific):    How long has patient had these symptoms:    Pharmacy name and phone #:    Best Call Back Number: [phone]  Additional Information: Patient's wife is calling to see if patient's lab orders can be faxed to her at

## 2018-08-08 NOTE — TELEPHONE ENCOUNTER
----- Message from Venita Magana sent at 8/8/2018  1:38 PM CDT -----  Contact: Amber  Type: Needs Medical Advice    Who Called:  Patient's wife Amber  Symptoms (please be specific):    How long has patient had these symptoms:    Pharmacy name and phone #:    Best Call Back Number: [phone]  Additional Information: patient's wife called to advise that labs orders wasn't received via fax,call to give fax#[phone] again,requesting orders be sent

## 2018-08-21 ENCOUNTER — OFFICE VISIT (OUTPATIENT)
Dept: RHEUMATOLOGY | Facility: CLINIC | Age: 53
End: 2018-08-21
Payer: MEDICARE

## 2018-08-21 VITALS
WEIGHT: 182.56 LBS | DIASTOLIC BLOOD PRESSURE: 88 MMHG | SYSTOLIC BLOOD PRESSURE: 146 MMHG | BODY MASS INDEX: 24.73 KG/M2 | HEART RATE: 69 BPM | HEIGHT: 72 IN

## 2018-08-21 DIAGNOSIS — R10.9 FLANK PAIN: ICD-10-CM

## 2018-08-21 DIAGNOSIS — N50.89 TESTICULAR SWELLING, RIGHT: ICD-10-CM

## 2018-08-21 DIAGNOSIS — M06.9 RHEUMATOID ARTHRITIS, INVOLVING UNSPECIFIED SITE, UNSPECIFIED RHEUMATOID FACTOR PRESENCE: Primary | ICD-10-CM

## 2018-08-21 DIAGNOSIS — R30.0 DYSURIA: ICD-10-CM

## 2018-08-21 DIAGNOSIS — N20.0 RENAL STONE: ICD-10-CM

## 2018-08-21 DIAGNOSIS — R59.9 ENLARGED LYMPH NODES: ICD-10-CM

## 2018-08-21 PROCEDURE — 99214 OFFICE O/P EST MOD 30 MIN: CPT | Mod: 25,S$GLB,, | Performed by: INTERNAL MEDICINE

## 2018-08-21 PROCEDURE — 3008F BODY MASS INDEX DOCD: CPT | Mod: CPTII,S$GLB,, | Performed by: INTERNAL MEDICINE

## 2018-08-21 PROCEDURE — 96372 THER/PROPH/DIAG INJ SC/IM: CPT | Mod: S$GLB,,, | Performed by: INTERNAL MEDICINE

## 2018-08-21 PROCEDURE — 99999 PR PBB SHADOW E&M-EST. PATIENT-LVL IV: CPT | Mod: PBBFAC,,, | Performed by: INTERNAL MEDICINE

## 2018-08-21 RX ORDER — ETODOLAC 500 MG/1
500 TABLET, FILM COATED ORAL 2 TIMES DAILY
Qty: 60 TABLET | Refills: 6 | Status: SHIPPED | OUTPATIENT
Start: 2018-08-21 | End: 2018-12-10

## 2018-08-21 RX ORDER — METHYLPREDNISOLONE ACETATE 80 MG/ML
160 INJECTION, SUSPENSION INTRA-ARTICULAR; INTRALESIONAL; INTRAMUSCULAR; SOFT TISSUE
Status: COMPLETED | OUTPATIENT
Start: 2018-08-21 | End: 2018-08-21

## 2018-08-21 RX ORDER — KETOROLAC TROMETHAMINE 30 MG/ML
60 INJECTION, SOLUTION INTRAMUSCULAR; INTRAVENOUS
Status: COMPLETED | OUTPATIENT
Start: 2018-08-21 | End: 2018-08-21

## 2018-08-21 RX ORDER — CYANOCOBALAMIN 1000 UG/ML
1000 INJECTION, SOLUTION INTRAMUSCULAR; SUBCUTANEOUS
Status: COMPLETED | OUTPATIENT
Start: 2018-08-21 | End: 2018-08-21

## 2018-08-21 RX ORDER — METHYLPREDNISOLONE 4 MG/1
4 TABLET ORAL DAILY
Qty: 30 TABLET | Refills: 4 | Status: SHIPPED | OUTPATIENT
Start: 2018-08-21 | End: 2018-12-10 | Stop reason: SDUPTHER

## 2018-08-21 RX ADMIN — METHYLPREDNISOLONE ACETATE 160 MG: 80 INJECTION, SUSPENSION INTRA-ARTICULAR; INTRALESIONAL; INTRAMUSCULAR; SOFT TISSUE at 01:08

## 2018-08-21 RX ADMIN — KETOROLAC TROMETHAMINE 60 MG: 30 INJECTION, SOLUTION INTRAMUSCULAR; INTRAVENOUS at 01:08

## 2018-08-21 RX ADMIN — CYANOCOBALAMIN 1000 MCG: 1000 INJECTION, SOLUTION INTRAMUSCULAR; SUBCUTANEOUS at 01:08

## 2018-08-21 NOTE — PROGRESS NOTES
Subjective:       Patient ID: Lokesh Melchor is a 53 y.o. male.    Chief Complaint: Follow-up    Follow up:  Newly dx RA was on plaquenil and mobic and held his meds due to  Back surgery and had a urethra obstruction.. Patient complains of arthralgias and myalgias for which has been present for a few years. Pain is located in multiple joints, both shoulder(s), both elbow(s), both wrist(s), both MCP(s): 1st, 2nd, 3rd, 4th and 5th, both PIP(s): 1st, 2nd, 3rd, 4th and 5th, both DIP(s): 1st and 2nd, both hip(s), both knee(s) and both MTP(s): 1st, 2nd, 3rd, 4th and 5th, is described as aching, pulsating, shooting and throbbing, and is constant, moderate .  Associated symptoms include: crepitation, decreased range of motion, edema, effusion, tenderness and warmth.       Review of Systems   Constitutional: Negative for activity change, appetite change, chills, diaphoresis and unexpected weight change.   HENT: Negative for congestion, ear pain, facial swelling, mouth sores, nosebleeds, postnasal drip, rhinorrhea, sinus pressure, sneezing, sore throat, tinnitus and voice change.    Eyes: Negative for pain, discharge, redness, itching and visual disturbance.   Respiratory: Negative for apnea, cough, chest tightness, shortness of breath and wheezing.    Cardiovascular: Negative for chest pain, palpitations and leg swelling.   Gastrointestinal: Negative for abdominal pain, constipation, diarrhea, nausea and vomiting.   Endocrine: Negative for cold intolerance, heat intolerance, polydipsia and polyuria.   Genitourinary: Negative for decreased urine volume, difficulty urinating, flank pain, frequency, hematuria and urgency.   Musculoskeletal: Positive for arthralgias, back pain, gait problem, joint swelling, neck pain and neck stiffness.   Skin: Positive for rash. Negative for pallor and wound.   Allergic/Immunologic: Negative for immunocompromised state.   Neurological: Negative for dizziness, tremors, seizures, syncope,  weakness and numbness.   Hematological: Negative for adenopathy. Does not bruise/bleed easily.   Psychiatric/Behavioral: Negative for sleep disturbance and suicidal ideas. The patient is not nervous/anxious.          Objective:   BP (!) 146/88 (BP Location: Left arm, Patient Position: Sitting, BP Method: Medium (Automatic))   Pulse 69   Ht 6' (1.829 m)   Wt 82.8 kg (182 lb 8.7 oz)   BMI 24.76 kg/m²      Physical Exam   Vitals reviewed.  Constitutional: He is oriented to person, place, and time and well-developed, well-nourished, and in no distress.   HENT:   Head: Normocephalic and atraumatic.   Mouth/Throat: Oropharynx is clear and moist.   Eyes: EOM are normal. Pupils are equal, round, and reactive to light.   Neck: Neck supple. No thyromegaly present.   Cardiovascular: Normal rate, regular rhythm and normal heart sounds.  Exam reveals no gallop and no friction rub.    No murmur heard.  Pulmonary/Chest: Breath sounds normal. He has no wheezes. He has no rales. He exhibits no tenderness.   Abdominal: There is no tenderness. There is no rebound and no guarding.       Right Side Rheumatological Exam     Examination finds the shoulder, elbow, wrist, knee, 1st PIP, 1st MCP, 2nd PIP, 2nd MCP, 3rd PIP, 3rd MCP, 4th PIP, 4th MCP, 5th PIP and 5th MCP normal.    Shoulder Exam   Tenderness Location: no tenderness    Range of Motion   Active abduction: abnormal   Adduction: abnormal  Sensation: normal    Knee Exam   Patellofemoral Crepitus: negative  Effusion: negative  Sensation: normal    Hip Exam   Tenderness Location: no tenderness  Sensation: normal    Elbow/Wrist Exam   Tenderness Location: no tenderness  Sensation: normal    Left Side Rheumatological Exam     Examination finds the shoulder, elbow, wrist, knee, 1st PIP, 1st MCP, 2nd PIP, 2nd MCP, 3rd PIP, 3rd MCP, 4th PIP, 4th MCP, 5th PIP and 5th MCP normal.    Shoulder Exam   Tenderness Location: no tenderness    Range of Motion   Active abduction: abnormal    Sensation: normal    Knee Exam     Patellofemoral Crepitus: negative  Effusion: negative  Sensation: normal    Hip Exam   Tenderness Location: no tenderness  Sensation: normal    Elbow/Wrist Exam   Sensation: normal      Back/Neck Exam   General Inspection   Gait: normal         Lymphadenopathy:     He has no cervical adenopathy.   Neurological: He is alert and oriented to person, place, and time. Gait normal.   Skin: No rash noted. No erythema. No pallor.     Psychiatric: Mood and affect normal.   Musculoskeletal: He exhibits tenderness and deformity.   oa and mcp, changes         ra 25 mthfr  heterozogy   Assessment:       1. Rheumatoid arthritis, involving unspecified site, unspecified rheumatoid factor presence    2. Testicular swelling, right    3. Dysuria    4. Enlarged lymph nodes    5. Renal stone            Plan:       Lokesh was seen today for follow-up.    Diagnoses and all orders for this visit:    Rheumatoid arthritis, involving unspecified site, unspecified rheumatoid factor presence  -     etodolac (LODINE) 500 MG tablet; Take 1 tablet (500 mg total) by mouth 2 (two) times daily.  -     methylPREDNISolone acetate injection 160 mg; Inject 2 mLs (160 mg total) into the muscle one time.  -     ketorolac injection 60 mg; Inject 2 mLs (60 mg total) into the muscle one time.  -     cyanocobalamin injection 1,000 mcg; Inject 1 mL (1,000 mcg total) into the muscle one time.  -     Comprehensive metabolic panel; Future  -     Comprehensive metabolic panel    Testicular swelling, right  -     etodolac (LODINE) 500 MG tablet; Take 1 tablet (500 mg total) by mouth 2 (two) times daily.  -     methylPREDNISolone acetate injection 160 mg; Inject 2 mLs (160 mg total) into the muscle one time.  -     ketorolac injection 60 mg; Inject 2 mLs (60 mg total) into the muscle one time.  -     cyanocobalamin injection 1,000 mcg; Inject 1 mL (1,000 mcg total) into the muscle one time.  -     Cancel: CT Chest Abdomen Pelvis W  W/O Contrast (XPD); Future  -     Cancel: CT Chest Abdomen Pelvis W W/O Contrast (XPD)  -     Comprehensive metabolic panel; Future  -     Comprehensive metabolic panel    Dysuria  -     etodolac (LODINE) 500 MG tablet; Take 1 tablet (500 mg total) by mouth 2 (two) times daily.  -     methylPREDNISolone acetate injection 160 mg; Inject 2 mLs (160 mg total) into the muscle one time.  -     ketorolac injection 60 mg; Inject 2 mLs (60 mg total) into the muscle one time.  -     cyanocobalamin injection 1,000 mcg; Inject 1 mL (1,000 mcg total) into the muscle one time.  -     Cancel: CT Chest Abdomen Pelvis W W/O Contrast (XPD); Future  -     Cancel: CT Chest Abdomen Pelvis W W/O Contrast (XPD)  -     Comprehensive metabolic panel; Future  -     Comprehensive metabolic panel  -     CT Abdomen Pelvis W Wo Contrast; Future  -     CT Abdomen Pelvis W Wo Contrast    Enlarged lymph nodes  -     Cancel: CT Chest Abdomen Pelvis W W/O Contrast (XPD); Future  -     Cancel: CT Chest Abdomen Pelvis W W/O Contrast (XPD)  -     Comprehensive metabolic panel; Future  -     Comprehensive metabolic panel  -     CT Abdomen Pelvis W Wo Contrast; Future  -     CT Abdomen Pelvis W Wo Contrast    Renal stone  -     Cancel: CT Chest Abdomen Pelvis W W/O Contrast (XPD); Future  -     Cancel: CT Chest Abdomen Pelvis W W/O Contrast (XPD)  -     Comprehensive metabolic panel; Future  -     Comprehensive metabolic panel  -     CT Abdomen Pelvis W Wo Contrast; Future  -     CT Abdomen Pelvis W Wo Contrast    Flank pain  -     CT Abdomen Pelvis W Wo Contrast; Future  -     CT Abdomen Pelvis W Wo Contrast    Other orders  -     methylPREDNISolone (MEDROL) 4 MG Tab; Take 1 tablet (4 mg total) by mouth once daily.    we may consider MTX as an alternative tx.

## 2018-08-21 NOTE — PROGRESS NOTES
Administered 1 cc Vitamin B12 1000mcg/cc to right ventrogluteal. Pt tolerated well. No acute reaction noted to site. Pt instructed on S/S to report. Advised patient to wait in lobby 15 minutes after receiving injection to monitor for any reactions. Pt verbalized understanding.     Lot: [id number]  Exp: Sep19  Administered 2 cc DepoMedrol 80mg/cc  to right ventrogluteal. Pt tolerated well. No acute reaction noted to site. Pt instructed on S/S to report. Advised patient to wait in lobby 15 minutes after receiving injection to monitor for any reactions..  Pt verbalized understanding.     Lot: [id number]  Exp: 07/19  Administered 2 cc  Toradol 30mg/cc  to right upper outer gluteal. Pt tolerated well. No acute reaction noted to site. Pt instructed on S/S to report. Advised patient to wait in lobby 15 minutes after receiving injection to monitor for any reactions. Pt verbalized understanding.     Lot: [id number]  Exp: [id number]

## 2018-09-12 ENCOUNTER — TELEPHONE (OUTPATIENT)
Dept: RHEUMATOLOGY | Facility: CLINIC | Age: 53
End: 2018-09-12

## 2018-09-12 NOTE — TELEPHONE ENCOUNTER
----- Message from Francheska Singletary sent at 9/12/2018 11:23 AM CDT -----  Contact: Wife  Amber Melchor, wife [phone] calling to speak with nurse in office regarding the order for a CT scan of his pelvis, was expecting a call but has not heard anything regarding getting this scheduled. Offered to call Radiology to schedule, she declined and said she wanted to speak with the nurse. Please advise. Thanks.

## 2018-09-18 DIAGNOSIS — R10.9 FLANK PAIN: Primary | ICD-10-CM

## 2018-09-19 ENCOUNTER — HOSPITAL ENCOUNTER (OUTPATIENT)
Dept: RADIOLOGY | Facility: HOSPITAL | Age: 53
Discharge: HOME | End: 2018-09-19
Attending: INTERNAL MEDICINE
Payer: MEDICARE

## 2018-09-19 PROCEDURE — 74178 CT ABD&PLV WO CNTR FLWD CNTR: CPT | Mod: TC,PO

## 2018-09-19 PROCEDURE — 74178 CT ABD&PLV WO CNTR FLWD CNTR: CPT | Mod: 26,,, | Performed by: RADIOLOGY

## 2018-09-19 PROCEDURE — 25500020 PHARM REV CODE 255: Mod: PO | Performed by: INTERNAL MEDICINE

## 2018-09-19 RX ADMIN — IOHEXOL 30 ML: 350 INJECTION, SOLUTION INTRAVENOUS at 10:09

## 2018-09-19 RX ADMIN — IOHEXOL 75 ML: 350 INJECTION, SOLUTION INTRAVENOUS at 10:09

## 2018-12-07 ENCOUNTER — TELEPHONE (OUTPATIENT)
Dept: RHEUMATOLOGY | Facility: CLINIC | Age: 53
End: 2018-12-07

## 2018-12-07 DIAGNOSIS — M05.719 RHEUMATOID ARTHRITIS INVOLVING SHOULDER WITH POSITIVE RHEUMATOID FACTOR, UNSPECIFIED LATERALITY: Primary | ICD-10-CM

## 2018-12-07 NOTE — TELEPHONE ENCOUNTER
----- Message from Agnes Hughes sent at 12/7/2018  9:34 AM CST -----  Contact: [phone]  Patient called requesting a call from nurse want to know should he have labs done prior to upcoming appt.    Please call upon patient request at [phone].

## 2018-12-07 NOTE — TELEPHONE ENCOUNTER
Needs lab orders placed in system. Pt ordinally to have externally, but decided to have done at [facility]. Orders placed.

## 2018-12-10 ENCOUNTER — OFFICE VISIT (OUTPATIENT)
Dept: RHEUMATOLOGY | Facility: CLINIC | Age: 53
End: 2018-12-10
Payer: MEDICARE

## 2018-12-10 DIAGNOSIS — M05.719 RHEUMATOID ARTHRITIS INVOLVING SHOULDER WITH POSITIVE RHEUMATOID FACTOR, UNSPECIFIED LATERALITY: Primary | ICD-10-CM

## 2018-12-10 DIAGNOSIS — N50.89 TESTICULAR SWELLING, RIGHT: ICD-10-CM

## 2018-12-10 DIAGNOSIS — M25.539 PAIN IN WRIST, UNSPECIFIED LATERALITY: ICD-10-CM

## 2018-12-10 DIAGNOSIS — S46.812A STRAIN OF LEFT TRAPEZIUS MUSCLE, INITIAL ENCOUNTER: ICD-10-CM

## 2018-12-10 DIAGNOSIS — M25.439 WRIST SWELLING, UNSPECIFIED LATERALITY: ICD-10-CM

## 2018-12-10 DIAGNOSIS — M25.569 KNEE PAIN, UNSPECIFIED CHRONICITY, UNSPECIFIED LATERALITY: ICD-10-CM

## 2018-12-10 PROCEDURE — 99999 PR PBB SHADOW E&M-EST. PATIENT-LVL II: CPT | Mod: PBBFAC,,, | Performed by: INTERNAL MEDICINE

## 2018-12-10 PROCEDURE — 99214 OFFICE O/P EST MOD 30 MIN: CPT | Mod: 25,S$GLB,, | Performed by: INTERNAL MEDICINE

## 2018-12-10 PROCEDURE — 20552 NJX 1/MLT TRIGGER POINT 1/2: CPT | Mod: S$GLB,,, | Performed by: INTERNAL MEDICINE

## 2018-12-10 RX ORDER — MELOXICAM 15 MG/1
TABLET ORAL
Qty: 30 TABLET | Refills: 6 | Status: SHIPPED | OUTPATIENT
Start: 2018-12-10 | End: 2021-11-30

## 2018-12-10 RX ORDER — METHYLPREDNISOLONE 4 MG/1
4 TABLET ORAL DAILY
Qty: 30 TABLET | Refills: 6 | Status: SHIPPED | OUTPATIENT
Start: 2018-12-10 | End: 2019-01-09

## 2018-12-10 RX ORDER — HYDROXYCHLOROQUINE SULFATE 200 MG/1
200 TABLET, FILM COATED ORAL 2 TIMES DAILY
Qty: 60 TABLET | Refills: 6 | Status: SHIPPED | OUTPATIENT
Start: 2018-12-10 | End: 2019-07-14

## 2018-12-10 NOTE — PROCEDURES
Procedures After verbal consent and cleansing with betadine and alcohol, skin was numbed with ethylene chloride spray, and  1cc 1% lidocaine. and depomedrol 10mg was injected into 2 places on the  Left and right trapezium region and trapezius muscle.  Patient tolerated procedure well

## 2018-12-10 NOTE — PROGRESS NOTES
Subjective:       Patient ID: Lokesh Melchor is a 53 y.o. male.    Chief Complaint: Disease Management; Pain; and Swelling    Follow up:  Newly dx RA was on plaquenil and mobic  L trap muscles  Pain x 2 weeks. Patient complains of arthralgias and myalgias for which has been present for a few years. Pain is located in multiple joints, both shoulder(s), both elbow(s), both wrist(s), both MCP(s): 1st, 2nd, 3rd, 4th and 5th, both PIP(s): 1st, 2nd, 3rd, 4th and 5th, both DIP(s): 1st and 2nd, both hip(s), both knee(s) and both MTP(s): 1st, 2nd, 3rd, 4th and 5th, is described as aching, pulsating, shooting and throbbing, and is constant, moderate .  Associated symptoms include: crepitation, decreased range of motion, edema, effusion, tenderness and warmth.       Review of Systems   Constitutional: Negative for activity change, appetite change, chills, diaphoresis and unexpected weight change.   HENT: Negative for congestion, ear pain, facial swelling, mouth sores, nosebleeds, postnasal drip, rhinorrhea, sinus pressure, sneezing, sore throat, tinnitus and voice change.    Eyes: Negative for pain, discharge, redness, itching and visual disturbance.   Respiratory: Negative for apnea, cough, chest tightness, shortness of breath and wheezing.    Cardiovascular: Negative for chest pain, palpitations and leg swelling.   Gastrointestinal: Negative for abdominal pain, constipation, diarrhea, nausea and vomiting.   Endocrine: Negative for cold intolerance, heat intolerance, polydipsia and polyuria.   Genitourinary: Negative for decreased urine volume, difficulty urinating, flank pain, frequency, hematuria and urgency.   Musculoskeletal: Positive for arthralgias, back pain, gait problem, joint swelling, neck pain and neck stiffness.   Skin: Positive for rash. Negative for pallor and wound.   Allergic/Immunologic: Negative for immunocompromised state.   Neurological: Negative for dizziness, tremors, seizures, syncope, weakness  and numbness.   Hematological: Negative for adenopathy. Does not bruise/bleed easily.   Psychiatric/Behavioral: Negative for sleep disturbance and suicidal ideas. The patient is not nervous/anxious.          Objective:   There were no vitals taken for this visit.     Physical Exam   Vitals reviewed.  Constitutional: He is oriented to person, place, and time and well-developed, well-nourished, and in no distress.   HENT:   Head: Normocephalic and atraumatic.   Mouth/Throat: Oropharynx is clear and moist.   Eyes: EOM are normal. Pupils are equal, round, and reactive to light.   Neck: Neck supple. No thyromegaly present.   Cardiovascular: Normal rate, regular rhythm and normal heart sounds.  Exam reveals no gallop and no friction rub.    No murmur heard.  Pulmonary/Chest: Breath sounds normal. He has no wheezes. He has no rales. He exhibits no tenderness.   Abdominal: There is no tenderness. There is no rebound and no guarding.       Right Side Rheumatological Exam     Examination finds the shoulder, elbow, wrist, knee, 1st PIP, 1st MCP, 2nd PIP, 2nd MCP, 3rd PIP, 3rd MCP, 4th PIP, 4th MCP, 5th PIP and 5th MCP normal.    Shoulder Exam   Tenderness Location: no tenderness    Range of Motion   Active abduction: abnormal   Adduction: abnormal  Sensation: normal    Knee Exam   Patellofemoral Crepitus: negative  Effusion: negative  Sensation: normal    Hip Exam   Tenderness Location: no tenderness  Sensation: normal    Elbow/Wrist Exam   Tenderness Location: no tenderness  Sensation: normal    Left Side Rheumatological Exam     Examination finds the shoulder, elbow, wrist, knee, 1st PIP, 1st MCP, 2nd PIP, 2nd MCP, 3rd PIP, 3rd MCP, 4th PIP, 4th MCP, 5th PIP and 5th MCP normal.    Shoulder Exam   Tenderness Location: no tenderness    Range of Motion   Active abduction: abnormal   Sensation: normal    Knee Exam     Patellofemoral Crepitus: negative  Effusion: negative  Sensation: normal    Hip Exam   Tenderness Location: no  tenderness  Sensation: normal    Elbow/Wrist Exam   Sensation: normal      Back/Neck Exam   General Inspection   Gait: normal         Lymphadenopathy:     He has no cervical adenopathy.   Neurological: He is alert and oriented to person, place, and time. Gait normal.   Skin: No rash noted. No erythema. No pallor.     Psychiatric: Mood and affect normal.   Musculoskeletal: He exhibits tenderness and deformity.   oa and mcp, changes         ra 25 mthfr  heterozogy   Assessment:       1. Rheumatoid arthritis involving shoulder with positive rheumatoid factor, unspecified laterality    2. Testicular swelling, right    3. Pain in wrist, unspecified laterality    4. Knee pain, unspecified chronicity, unspecified laterality    5. Wrist swelling, unspecified laterality            Plan:       Lokesh was seen today for disease management, pain and swelling.    Diagnoses and all orders for this visit:    Rheumatoid arthritis involving shoulder with positive rheumatoid factor, unspecified laterality  -     methylPREDNISolone (MEDROL) 4 MG Tab; Take 1 tablet (4 mg total) by mouth once daily.  -     hydroxychloroquine (PLAQUENIL) 200 mg tablet; Take 1 tablet (200 mg total) by mouth 2 (two) times daily.  -     meloxicam (MOBIC) 15 MG tablet; TAKE 1 TABLET(15 MG) BY MOUTH EVERY DAY  -     Comprehensive metabolic panel; Future  -     CBC auto differential; Future  -     C-reactive protein; Future  -     Sedimentation rate, automated; Future  -     Rheumatoid factor; Future  -     C-reactive protein; Future  -     Cyclic citrul peptide antibody, IgG; Future    Testicular swelling, right  -     Comprehensive metabolic panel; Future  -     CBC auto differential; Future  -     C-reactive protein; Future  -     Sedimentation rate, automated; Future  -     Rheumatoid factor; Future  -     C-reactive protein; Future  -     Cyclic citrul peptide antibody, IgG; Future    Pain in wrist, unspecified laterality  -     methylPREDNISolone (MEDROL)  4 MG Tab; Take 1 tablet (4 mg total) by mouth once daily.  -     hydroxychloroquine (PLAQUENIL) 200 mg tablet; Take 1 tablet (200 mg total) by mouth 2 (two) times daily.  -     meloxicam (MOBIC) 15 MG tablet; TAKE 1 TABLET(15 MG) BY MOUTH EVERY DAY  -     Comprehensive metabolic panel; Future  -     CBC auto differential; Future  -     C-reactive protein; Future  -     Sedimentation rate, automated; Future  -     Rheumatoid factor; Future  -     C-reactive protein; Future  -     Cyclic citrul peptide antibody, IgG; Future    Knee pain, unspecified chronicity, unspecified laterality  -     methylPREDNISolone (MEDROL) 4 MG Tab; Take 1 tablet (4 mg total) by mouth once daily.  -     hydroxychloroquine (PLAQUENIL) 200 mg tablet; Take 1 tablet (200 mg total) by mouth 2 (two) times daily.  -     meloxicam (MOBIC) 15 MG tablet; TAKE 1 TABLET(15 MG) BY MOUTH EVERY DAY  -     Comprehensive metabolic panel; Future  -     CBC auto differential; Future  -     C-reactive protein; Future  -     Sedimentation rate, automated; Future  -     Rheumatoid factor; Future  -     C-reactive protein; Future  -     Cyclic citrul peptide antibody, IgG; Future    Wrist swelling, unspecified laterality  -     methylPREDNISolone (MEDROL) 4 MG Tab; Take 1 tablet (4 mg total) by mouth once daily.  -     hydroxychloroquine (PLAQUENIL) 200 mg tablet; Take 1 tablet (200 mg total) by mouth 2 (two) times daily.  -     meloxicam (MOBIC) 15 MG tablet; TAKE 1 TABLET(15 MG) BY MOUTH EVERY DAY  -     Comprehensive metabolic panel; Future  -     CBC auto differential; Future  -     C-reactive protein; Future  -     Sedimentation rate, automated; Future  -     Rheumatoid factor; Future  -     C-reactive protein; Future  -     Cyclic citrul peptide antibody, IgG; Future    did a ct and it was neg will rec f/u with Dr Shahid for testicular swelling R side

## 2019-02-01 RX ORDER — METHYLPREDNISOLONE 4 MG/1
TABLET ORAL
Qty: 30 TABLET | Refills: 5 | Status: SHIPPED | OUTPATIENT
Start: 2019-02-01 | End: 2019-07-14

## 2019-08-14 ENCOUNTER — TELEPHONE (OUTPATIENT)
Dept: ORTHOPEDICS | Facility: CLINIC | Age: 54
End: 2019-08-14

## 2019-08-14 NOTE — TELEPHONE ENCOUNTER
Talked with wife and notified patient that he can come today If he can be here before 1130 or I can offer him an appointment tomorrow. Patient chose appointment for tomorrow     ----- Message from Lonnie Garcia sent at 8/14/2019 10:13 AM CDT -----  Contact: pt  Patient Fx L hand 4 weeks ago, wants to be seen ASAP, [phone]

## 2019-08-15 ENCOUNTER — OFFICE VISIT (OUTPATIENT)
Dept: ORTHOPEDICS | Facility: CLINIC | Age: 54
End: 2019-08-15
Payer: MEDICARE

## 2019-08-15 ENCOUNTER — HOSPITAL ENCOUNTER (OUTPATIENT)
Dept: RADIOLOGY | Facility: HOSPITAL | Age: 54
Discharge: HOME | End: 2019-08-15
Attending: ORTHOPAEDIC SURGERY
Payer: MEDICARE

## 2019-08-15 VITALS — WEIGHT: 178 LBS | HEIGHT: 72 IN | BODY MASS INDEX: 24.11 KG/M2

## 2019-08-15 DIAGNOSIS — S62.392A CLOSED NONDISPLACED FRACTURE OF OTHER PART OF THIRD METACARPAL BONE OF RIGHT HAND, INITIAL ENCOUNTER: ICD-10-CM

## 2019-08-15 DIAGNOSIS — M79.642 LEFT HAND PAIN: Primary | ICD-10-CM

## 2019-08-15 DIAGNOSIS — S69.92XA INJURY OF LEFT HAND, INITIAL ENCOUNTER: ICD-10-CM

## 2019-08-15 DIAGNOSIS — M79.642 LEFT HAND PAIN: ICD-10-CM

## 2019-08-15 PROCEDURE — 26600 PR CLOSED RX METACARPAL FX: ICD-10-PCS | Mod: RT,S$GLB,, | Performed by: ORTHOPAEDIC SURGERY

## 2019-08-15 PROCEDURE — 73130 X-RAY EXAM OF HAND: CPT | Mod: 26,LT,, | Performed by: RADIOLOGY

## 2019-08-15 PROCEDURE — 3008F BODY MASS INDEX DOCD: CPT | Mod: CPTII,S$GLB,, | Performed by: ORTHOPAEDIC SURGERY

## 2019-08-15 PROCEDURE — 97760 PR ORTHOTIC MGMT&TRAINJ INITIAL ENC EA 15 MINS: ICD-10-PCS | Mod: GP,S$GLB,, | Performed by: ORTHOPAEDIC SURGERY

## 2019-08-15 PROCEDURE — 99999 PR PBB SHADOW E&M-EST. PATIENT-LVL III: CPT | Mod: PBBFAC,,, | Performed by: ORTHOPAEDIC SURGERY

## 2019-08-15 PROCEDURE — 3008F PR BODY MASS INDEX (BMI) DOCUMENTED: ICD-10-PCS | Mod: CPTII,S$GLB,, | Performed by: ORTHOPAEDIC SURGERY

## 2019-08-15 PROCEDURE — 73130 XR HAND COMPLETE 3 VIEW LEFT: ICD-10-PCS | Mod: 26,LT,, | Performed by: RADIOLOGY

## 2019-08-15 PROCEDURE — 99204 PR OFFICE/OUTPT VISIT, NEW, LEVL IV, 45-59 MIN: ICD-10-PCS | Mod: 57,S$GLB,, | Performed by: ORTHOPAEDIC SURGERY

## 2019-08-15 PROCEDURE — 97760 ORTHOTIC MGMT&TRAING 1ST ENC: CPT | Mod: GP,S$GLB,, | Performed by: ORTHOPAEDIC SURGERY

## 2019-08-15 PROCEDURE — 26600 TREAT METACARPAL FRACTURE: CPT | Mod: RT,S$GLB,, | Performed by: ORTHOPAEDIC SURGERY

## 2019-08-15 PROCEDURE — 99204 OFFICE O/P NEW MOD 45 MIN: CPT | Mod: 57,S$GLB,, | Performed by: ORTHOPAEDIC SURGERY

## 2019-08-15 PROCEDURE — 73130 X-RAY EXAM OF HAND: CPT | Mod: TC,PO,LT

## 2019-08-15 PROCEDURE — 99999 PR PBB SHADOW E&M-EST. PATIENT-LVL III: ICD-10-PCS | Mod: PBBFAC,,, | Performed by: ORTHOPAEDIC SURGERY

## 2019-08-15 NOTE — PROGRESS NOTES
54 years old, injured his left hand four weeks ago, told he had a fracture that   has occurred after a fall.  He was feeling good and then just a couple of days   ago, went to push a heavy object.  He reinjured his hand.  Pain has increased.    Feels that he re-broke it.    PHYSICAL EXAMINATION:  Today shows no rotational deformity.  Tender along the   third metacarpal.  Flexor and extensor are intact.  Compartments are soft.  Skin   is intact.    X-rays show lucency at the third metacarpal healing about the lucency.    ASSESSMENT:  Refracture to left third metacarpal.    PLAN:  Hand orthotic, gentle range of motion.  Follow up in a month's time as a   postoperative visit with x-rays of his left hand.      PBB/HN  dd: 08/15/2019 16:48:09 (CDT)  td: 08/16/2019 05:16:48 (CDT)  Doc ID   #[id number]  Job ID #[id number]    CC:     Further History  Aching pain  Worse with activity  Relieved with rest  No other associated symptoms  No other radiation    Further Exam  Alert and oriented  Pleasant  Contralateral limb has appropriate range of motion for age and condition  Contralateral limb has appropriate strength for age and condition  Contralateral limb has appropriate stability  for age and condition  No adenopathy  Pulses are appropriate for current condition  Skin is intact        Chief Complaint    Chief Complaint   Patient presents with    Left Hand - Pain, Swelling, Injury       HPI  Lokesh Melchor is a 54 y.o.  male who presents with       Past Medical History  Past Medical History:   Diagnosis Date    Epididymitis     Rheumatoid arthritis     Sciatica     UTI (urinary tract infection)        Past Surgical History  Past Surgical History:   Procedure Laterality Date    BACK SURGERY      x 3    CIRCUMCISION      KNEE SURGERY      MCL repair       Medications  Current Outpatient Medications   Medication Sig    HYDROcodone-acetaminophen (NORCO) 7.5-325 mg per tablet Take 1 tablet by mouth every 4 (four) hours as  needed for Pain.    ibuprofen (ADVIL,MOTRIN) 600 MG tablet Take 1 tablet (600 mg total) by mouth every 6 (six) hours as needed.    meloxicam (MOBIC) 15 MG tablet TAKE 1 TABLET(15 MG) BY MOUTH EVERY DAY    SUBOXONE 4-1 mg Film Place 1 Film inside cheek once daily.      No current facility-administered medications for this visit.        Allergies  Review of patient's allergies indicates:   Allergen Reactions    Codeine Rash       Family History  Family History   Problem Relation Age of Onset    No Known Problems Mother     No Known Problems Father     No Known Problems Sister     No Known Problems Brother     Nephrolithiasis Neg Hx     Cancer Neg Hx        Social History  Social History     Socioeconomic History    Marital status:      Spouse name: Not on file    Number of children: Not on file    Years of education: Not on file    Highest education level: Not on file   Occupational History    Not on file   Social Needs    Financial resource strain: Not on file    Food insecurity:     Worry: Not on file     Inability: Not on file    Transportation needs:     Medical: Not on file     Non-medical: Not on file   Tobacco Use    Smoking status: Never Smoker    Smokeless tobacco: Never Used   Substance and Sexual Activity    Alcohol use: Yes     Comment: seldom    Drug use: No     Comment: quit in 2007 went to rehab- addicted to opiates    Sexual activity: Yes   Lifestyle    Physical activity:     Days per week: Not on file     Minutes per session: Not on file    Stress: Not on file   Relationships    Social connections:     Talks on phone: Not on file     Gets together: Not on file     Attends [employer] service: Not on file     Active member of club or organization: Not on file     Attends meetings of clubs or organizations: Not on file     Relationship status: Not on file   Other Topics Concern    Not on file   Social History Narrative    Not on file               Review of Systems      Constitutional: Negative    HENT: Negative  Eyes: Negative  Respiratory: Negative  Cardiovascular: Negative  Musculoskeletal: HPI  Skin: Negative  Neurological: Negative  Hematological: Negative  Endocrine: Negative                 Physical Exam    There were no vitals filed for this visit.  Body mass index is 24.14 kg/m².  Physical Examination:     General appearance -  well appearing, and in no distress  Mental status - awake  Neck - supple  Chest -  symmetric air entry  Heart - normal rate   Abdomen - soft      Assessment     1. Left hand pain    2. Injury of left hand, initial encounter    3. Closed nondisplaced fracture of other part of third metacarpal bone of right hand, initial encounter          PlanWe performed a custom orthotic/brace fitting, adjusting and training with the patient. The patient demonstrated understanding and proper care. This was performed for 15 minutes.

## 2019-09-10 ENCOUNTER — TELEPHONE (OUTPATIENT)
Dept: RHEUMATOLOGY | Facility: CLINIC | Age: 54
End: 2019-09-10

## 2019-09-10 NOTE — TELEPHONE ENCOUNTER
----- Message from Kell Angeles sent at 9/10/2019 10:51 AM CDT -----  Contact: wifeAmber  Type: Needs Medical Advice    Who Called:  WifeAmber  Symptoms (please be specific):    How long has patient had these symptoms:   Pharmacy name and phone #:    Best Call Back Number: [phone] (home)   Additional Information: Patient's wife would like a call back regarding a form she needs filled out for the patient. She has a few questions. Thanks!

## 2019-09-10 NOTE — TELEPHONE ENCOUNTER
Pt not seen since 2018. Pt wife advise to contact PCP for requested handicapped tag. Wife verbalized understanding.

## 2019-09-12 DIAGNOSIS — M05.719 RHEUMATOID ARTHRITIS INVOLVING SHOULDER WITH POSITIVE RHEUMATOID FACTOR, UNSPECIFIED LATERALITY: Primary | ICD-10-CM

## 2019-09-16 RX ORDER — METHYLPREDNISOLONE 4 MG/1
TABLET ORAL
Qty: 30 TABLET | Refills: 0 | Status: SHIPPED | OUTPATIENT
Start: 2019-09-16 | End: 2021-11-30 | Stop reason: SDUPTHER

## 2020-05-27 ENCOUNTER — TELEPHONE (OUTPATIENT)
Dept: RHEUMATOLOGY | Facility: CLINIC | Age: 55
End: 2020-05-27

## 2020-05-27 NOTE — TELEPHONE ENCOUNTER
Returned patient call regarding lab orders. Nurse informed since it has been since 2018 Dr Velazquez will re evaluate him at appointment and order new blood work at that time. Verbalized understanding.

## 2020-05-27 NOTE — TELEPHONE ENCOUNTER
----- Message from Carli Eve sent at 5/27/2020  4:47 PM CDT -----  Contact: spouse  Type: Needs Medical Advice    Who Called:      Best Call Back Number:   Additional Information: Requesting a call back from Nurse, regarding pt needs orders in for blood ,please call back with advice

## 2020-06-11 ENCOUNTER — TELEPHONE (OUTPATIENT)
Dept: RHEUMATOLOGY | Facility: CLINIC | Age: 55
End: 2020-06-11

## 2020-06-11 NOTE — TELEPHONE ENCOUNTER
----- Message from Venita Magana sent at 6/11/2020  3:56 PM CDT -----  Contact: Amber  Type: Needs Medical Advice  Who Called:  Patient's wife Amber  Symptoms (please be specific):    How long has patient had these symptoms:    Pharmacy name and phone #:    Best Call Back Number:   Additional Information: requesting a call back regarding today's virtual appt,stated been waiting since 2:30pm for a visit,place [facility] pod

## 2020-09-01 ENCOUNTER — PATIENT MESSAGE (OUTPATIENT)
Dept: RHEUMATOLOGY | Facility: CLINIC | Age: 55
End: 2020-09-01

## 2020-09-01 DIAGNOSIS — M25.569 KNEE PAIN, UNSPECIFIED CHRONICITY, UNSPECIFIED LATERALITY: ICD-10-CM

## 2020-09-01 DIAGNOSIS — M25.539 PAIN IN WRIST, UNSPECIFIED LATERALITY: ICD-10-CM

## 2020-09-01 DIAGNOSIS — N50.89 TESTICULAR SWELLING, RIGHT: ICD-10-CM

## 2020-09-01 DIAGNOSIS — M05.719 RHEUMATOID ARTHRITIS INVOLVING SHOULDER WITH POSITIVE RHEUMATOID FACTOR, UNSPECIFIED LATERALITY: Primary | ICD-10-CM

## 2021-05-06 ENCOUNTER — PATIENT MESSAGE (OUTPATIENT)
Dept: RESEARCH | Facility: HOSPITAL | Age: 56
End: 2021-05-06

## 2021-06-01 ENCOUNTER — TELEPHONE (OUTPATIENT)
Dept: RHEUMATOLOGY | Facility: CLINIC | Age: 56
End: 2021-06-01

## 2021-06-14 ENCOUNTER — TELEPHONE (OUTPATIENT)
Dept: RHEUMATOLOGY | Facility: CLINIC | Age: 56
End: 2021-06-14

## 2021-06-15 ENCOUNTER — TELEPHONE (OUTPATIENT)
Dept: RHEUMATOLOGY | Facility: CLINIC | Age: 56
End: 2021-06-15

## 2021-11-30 ENCOUNTER — OFFICE VISIT (OUTPATIENT)
Dept: RHEUMATOLOGY | Facility: CLINIC | Age: 56
End: 2021-11-30
Payer: MEDICARE

## 2021-11-30 VITALS
HEART RATE: 69 BPM | WEIGHT: 172 LBS | HEIGHT: 72 IN | BODY MASS INDEX: 23.3 KG/M2 | SYSTOLIC BLOOD PRESSURE: 157 MMHG | DIASTOLIC BLOOD PRESSURE: 82 MMHG

## 2021-11-30 DIAGNOSIS — M25.539 PAIN IN WRIST, UNSPECIFIED LATERALITY: ICD-10-CM

## 2021-11-30 DIAGNOSIS — E55.9 VITAMIN D DEFICIENCY, UNSPECIFIED: ICD-10-CM

## 2021-11-30 DIAGNOSIS — M05.719 RHEUMATOID ARTHRITIS INVOLVING SHOULDER WITH POSITIVE RHEUMATOID FACTOR, UNSPECIFIED LATERALITY: Primary | ICD-10-CM

## 2021-11-30 DIAGNOSIS — M25.569 KNEE PAIN, UNSPECIFIED CHRONICITY, UNSPECIFIED LATERALITY: ICD-10-CM

## 2021-11-30 DIAGNOSIS — G89.4 CHRONIC PAIN SYNDROME: ICD-10-CM

## 2021-11-30 DIAGNOSIS — M43.26 FUSION OF SPINE OF LUMBAR REGION: ICD-10-CM

## 2021-11-30 PROCEDURE — 96372 PR INJECTION,THERAP/PROPH/DIAG2ST, IM OR SUBCUT: ICD-10-PCS | Mod: S$GLB,,, | Performed by: INTERNAL MEDICINE

## 2021-11-30 PROCEDURE — 99215 PR OFFICE/OUTPT VISIT, EST, LEVL V, 40-54 MIN: ICD-10-PCS | Mod: 25,S$GLB,, | Performed by: INTERNAL MEDICINE

## 2021-11-30 PROCEDURE — 96372 THER/PROPH/DIAG INJ SC/IM: CPT | Mod: S$GLB,,, | Performed by: INTERNAL MEDICINE

## 2021-11-30 PROCEDURE — 99215 OFFICE O/P EST HI 40 MIN: CPT | Mod: 25,S$GLB,, | Performed by: INTERNAL MEDICINE

## 2021-11-30 PROCEDURE — 99999 PR PBB SHADOW E&M-EST. PATIENT-LVL III: CPT | Mod: PBBFAC,,, | Performed by: INTERNAL MEDICINE

## 2021-11-30 PROCEDURE — 99999 PR PBB SHADOW E&M-EST. PATIENT-LVL III: ICD-10-PCS | Mod: PBBFAC,,, | Performed by: INTERNAL MEDICINE

## 2021-11-30 RX ORDER — METHYLPREDNISOLONE ACETATE 80 MG/ML
160 INJECTION, SUSPENSION INTRA-ARTICULAR; INTRALESIONAL; INTRAMUSCULAR; SOFT TISSUE
Status: COMPLETED | OUTPATIENT
Start: 2021-11-30 | End: 2021-11-30

## 2021-11-30 RX ORDER — METHYLPREDNISOLONE ACETATE 40 MG/ML
160 INJECTION, SUSPENSION INTRA-ARTICULAR; INTRALESIONAL; INTRAMUSCULAR; SOFT TISSUE
Status: COMPLETED | OUTPATIENT
Start: 2021-11-30 | End: 2021-11-30

## 2021-11-30 RX ORDER — CYANOCOBALAMIN 1000 UG/ML
1000 INJECTION, SOLUTION INTRAMUSCULAR; SUBCUTANEOUS
Status: COMPLETED | OUTPATIENT
Start: 2021-11-30 | End: 2021-11-30

## 2021-11-30 RX ORDER — HYDROXYCHLOROQUINE SULFATE 200 MG/1
200 TABLET, FILM COATED ORAL 2 TIMES DAILY
COMMUNITY
Start: 2021-08-20 | End: 2021-11-30 | Stop reason: SDUPTHER

## 2021-11-30 RX ORDER — KETOROLAC TROMETHAMINE 30 MG/ML
60 INJECTION, SOLUTION INTRAMUSCULAR; INTRAVENOUS
Status: COMPLETED | OUTPATIENT
Start: 2021-11-30 | End: 2021-11-30

## 2021-11-30 RX ORDER — HYDROXYCHLOROQUINE SULFATE 200 MG/1
200 TABLET, FILM COATED ORAL 2 TIMES DAILY
Qty: 180 TABLET | Refills: 3 | Status: SHIPPED | OUTPATIENT
Start: 2021-11-30 | End: 2022-11-06

## 2021-11-30 RX ORDER — METHYLPREDNISOLONE 4 MG/1
8 TABLET ORAL DAILY
Qty: 60 TABLET | Refills: 11 | Status: SHIPPED | OUTPATIENT
Start: 2021-11-30 | End: 2022-11-30

## 2021-11-30 RX ADMIN — METHYLPREDNISOLONE ACETATE 160 MG: 40 INJECTION, SUSPENSION INTRA-ARTICULAR; INTRALESIONAL; INTRAMUSCULAR; SOFT TISSUE at 02:11

## 2021-11-30 RX ADMIN — CYANOCOBALAMIN 1000 MCG: 1000 INJECTION, SOLUTION INTRAMUSCULAR; SUBCUTANEOUS at 02:11

## 2021-11-30 RX ADMIN — KETOROLAC TROMETHAMINE 60 MG: 30 INJECTION, SOLUTION INTRAMUSCULAR; INTRAVENOUS at 02:11

## 2021-11-30 ASSESSMENT — ROUTINE ASSESSMENT OF PATIENT INDEX DATA (RAPID3)
PATIENT GLOBAL ASSESSMENT SCORE: 6
PAIN SCORE: 8
MDHAQ FUNCTION SCORE: 0.8
PSYCHOLOGICAL DISTRESS SCORE: 2.2
FATIGUE SCORE: 2.2
TOTAL RAPID3 SCORE: 5.55

## 2021-12-04 ENCOUNTER — TELEPHONE (OUTPATIENT)
Dept: RHEUMATOLOGY | Facility: CLINIC | Age: 56
End: 2021-12-04
Payer: MEDICARE

## 2022-03-10 ENCOUNTER — PATIENT MESSAGE (OUTPATIENT)
Dept: RHEUMATOLOGY | Facility: CLINIC | Age: 57
End: 2022-03-10
Payer: MEDICARE

## 2023-01-24 DIAGNOSIS — M05.719 RHEUMATOID ARTHRITIS INVOLVING SHOULDER WITH POSITIVE RHEUMATOID FACTOR, UNSPECIFIED LATERALITY: Primary | ICD-10-CM

## 2023-01-24 DIAGNOSIS — G89.4 CHRONIC PAIN SYNDROME: ICD-10-CM

## 2023-01-24 NOTE — TELEPHONE ENCOUNTER
----- Message from Nathan Dempsey sent at 1/24/2023  8:39 AM CST -----  Contact: Self  Type: Needs Medical Advice  Who Called:  Spouse/Amber  Best Call Back Number: [phone]  Additional Information: Called to speak with office regarding methylprednisolone 4 MG  refill and recent blood labs

## 2023-01-29 RX ORDER — METHYLPREDNISOLONE 4 MG/1
4 TABLET ORAL DAILY
Qty: 30 TABLET | Refills: 11 | Status: SHIPPED | OUTPATIENT
Start: 2023-01-29 | End: 2023-02-28

## 2023-05-08 ENCOUNTER — TELEPHONE (OUTPATIENT)
Dept: RHEUMATOLOGY | Facility: CLINIC | Age: 58
End: 2023-05-08
Payer: MEDICARE

## 2023-05-08 NOTE — TELEPHONE ENCOUNTER
S/w pt's wife and informed her that lab orders are in pt's chart and [facility] can see them.  She verbalized understanding.    Basia Wilkinson MA  [facility]  5/8/2023

## 2023-05-08 NOTE — TELEPHONE ENCOUNTER
----- Message from Ratna Adkins sent at 5/8/2023 10:21 AM CDT -----  Regarding: advise  Contact: patient    Type: Needs Medical Advice  Who Called:  Patient  Symptoms (please be specific):  blood work  How long has patient had these symptoms:    Pharmacy name and phone #:    Best Call Back Number: [phone]    Additional Information: Needs blood work sent to [facility]. Thanks!

## 2023-06-02 ENCOUNTER — OFFICE VISIT (OUTPATIENT)
Dept: RHEUMATOLOGY | Facility: CLINIC | Age: 58
End: 2023-06-02
Payer: MEDICARE

## 2023-06-02 DIAGNOSIS — Z79.899 HIGH RISK MEDICATION USE: ICD-10-CM

## 2023-06-02 DIAGNOSIS — M17.10 KNEE ARTHROPATHY: ICD-10-CM

## 2023-06-02 DIAGNOSIS — M53.86 SCIATICA ASSOCIATED WITH DISORDER OF LUMBAR SPINE: ICD-10-CM

## 2023-06-02 DIAGNOSIS — R10.9 ABDOMINAL PAIN, UNSPECIFIED ABDOMINAL LOCATION: ICD-10-CM

## 2023-06-02 DIAGNOSIS — B18.2 HEPATITIS C CARRIER: Primary | ICD-10-CM

## 2023-06-02 DIAGNOSIS — D84.821 DRUG-INDUCED IMMUNODEFICIENCY: ICD-10-CM

## 2023-06-02 DIAGNOSIS — M05.719 RHEUMATOID ARTHRITIS INVOLVING SHOULDER WITH POSITIVE RHEUMATOID FACTOR, UNSPECIFIED LATERALITY: ICD-10-CM

## 2023-06-02 DIAGNOSIS — Z79.899 DRUG-INDUCED IMMUNODEFICIENCY: ICD-10-CM

## 2023-06-02 PROCEDURE — 1159F PR MEDICATION LIST DOCUMENTED IN MEDICAL RECORD: ICD-10-PCS | Mod: CPTII,95,, | Performed by: INTERNAL MEDICINE

## 2023-06-02 PROCEDURE — 99215 PR OFFICE/OUTPT VISIT, EST, LEVL V, 40-54 MIN: ICD-10-PCS | Mod: 95,,, | Performed by: INTERNAL MEDICINE

## 2023-06-02 PROCEDURE — 1159F MED LIST DOCD IN RCRD: CPT | Mod: CPTII,95,, | Performed by: INTERNAL MEDICINE

## 2023-06-02 PROCEDURE — 99215 OFFICE O/P EST HI 40 MIN: CPT | Mod: 95,,, | Performed by: INTERNAL MEDICINE

## 2023-06-02 PROCEDURE — 1160F RVW MEDS BY RX/DR IN RCRD: CPT | Mod: CPTII,95,, | Performed by: INTERNAL MEDICINE

## 2023-06-02 PROCEDURE — 1160F PR REVIEW ALL MEDS BY PRESCRIBER/CLIN PHARMACIST DOCUMENTED: ICD-10-PCS | Mod: CPTII,95,, | Performed by: INTERNAL MEDICINE

## 2023-06-02 NOTE — PROGRESS NOTES
Subjective:       Patient ID: Lokesh Melchor is a 58 y.o. male.    Chief Complaint: No chief complaint on file.    HPI::56 yr  Newly dx RA was on plaquenil, prednisone prn and mobic and held his meds due to  Back surgery seen 3 plus years ago Single fusion then triple fusion L4-5, S1 and had a urethra obstruction. Patient complains of arthralgias and myalgias for which has been present for a few years. He has had acute thoracic spine pain he will see Dr Mcgrath to be reevaluatedPain is located in multiple joints, both shoulder(s), both elbow(s), both wrist(s), both MCP(s): 1st, 2nd, 3rd, 4th and 5th, both PIP(s): 1st, 2nd, 3rd, 4th and 5th, both DIP(s): 1st and 2nd, both hip(s), both knee(s) and both MTP(s): 1st, 2nd, 3rd, 4th and 5th, is described as aching, pulsating, shooting and throbbing, and is constant, moderate .  Associated symptoms include: crepitation, decreased range of motion, edema, effusion, tenderness and warmth.     Review of Systems   Constitutional:  Positive for activity change. Negative for appetite change, chills, diaphoresis and unexpected weight change.   HENT:  Negative for congestion, ear pain, facial swelling, mouth sores, nosebleeds, postnasal drip, rhinorrhea, sinus pressure, sneezing, sore throat, tinnitus and voice change.    Eyes:  Negative for pain, discharge, redness, itching and visual disturbance.   Respiratory:  Negative for apnea, cough, chest tightness, shortness of breath and wheezing.    Cardiovascular:  Negative for chest pain, palpitations and leg swelling.   Gastrointestinal:  Negative for abdominal pain, constipation, diarrhea, nausea and vomiting.   Endocrine: Negative for cold intolerance, heat intolerance, polydipsia and polyuria.   Genitourinary:  Negative for decreased urine volume, difficulty urinating, flank pain, frequency, hematuria and urgency.   Musculoskeletal:  Positive for arthralgias, back pain, gait problem, neck pain and neck stiffness.   Skin:   Positive for rash. Negative for pallor and wound.   Allergic/Immunologic: Negative for immunocompromised state.   Neurological:  Negative for dizziness, tremors, seizures, syncope, weakness and numbness.   Hematological:  Negative for adenopathy. Does not bruise/bleed easily.   Psychiatric/Behavioral:  Negative for sleep disturbance and suicidal ideas. The patient is not nervous/anxious.        Objective:   There were no vitals taken for this visit.     Physical Exam   Constitutional: He is oriented to person, place, and time.   Neurological: He is alert and oriented to person, place, and time.   Psychiatric: Mood, affect and judgment normal.     ra 25 mthfr  heterozogy   Results for orders placed or performed in visit on 05/30/23   Sedimentation rate   Result Value Ref Range    Sed Rate 6 0 - 19 mm/Hr   Comprehensive Metabolic Panel   Result Value Ref Range    Sodium 138 136 - 145 mmol/L    Potassium 4.2 3.5 - 5.1 mmol/L    Chloride 103 95 - 110 mmol/L    CO2 31 22 - 31 mmol/L    Glucose 116 (H) 70 - 110 mg/dL    BUN 23 (H) 9 - 21 mg/dL    Creatinine 0.95 0.50 - 1.40 mg/dL    Calcium 8.9 8.4 - 10.2 mg/dL    Total Protein 7.1 6.0 - 8.4 g/dL    Albumin 4.4 3.5 - 5.2 g/dL    Total Bilirubin 1.2 0.2 - 1.3 mg/dL    Alkaline Phosphatase 84 38 - 145 U/L    AST 29 17 - 59 U/L    ALT 29 0 - 50 U/L    Anion Gap 4 mmol/L    eGFR >60 >60 mL/min/1.73 m^2   CBC Auto Differential   Result Value Ref Range    WBC 8.76 3.90 - 12.70 K/uL    RBC 4.80 4.60 - 6.20 M/uL    Hemoglobin 14.3 14.0 - 18.0 g/dL    Hematocrit 44.2 40.0 - 54.0 %    MCV 92 82 - 98 fL    MCH 29.8 27.0 - 31.0 pg    MCHC 32.4 32.0 - 36.0 g/dL    RDW 13.9 11.5 - 14.5 %    Platelets 281 150 - 450 K/uL    MPV 11.5 9.2 - 12.9 fL    Immature Granulocytes 0.2 0.0 - 0.5 %    Gran # (ANC) 5.9 1.8 - 7.7 K/uL    Immature Grans (Abs) 0.02 0.00 - 0.04 K/uL    Lymph # 1.9 1.0 - 4.8 K/uL    Mono # 0.9 0.3 - 1.0 K/uL    Eos # 0.1 0.0 - 0.5 K/uL    Baso # 0.03 0.00 - 0.20 K/uL     nRBC 0 0 /100 WBC    Gran % 66.9 38.0 - 73.0 %    Lymph % 21.8 18.0 - 48.0 %    Mono % 9.7 4.0 - 15.0 %    Eosinophil % 1.1 0.0 - 8.0 %    Basophil % 0.3 0.0 - 1.9 %    Differential Method Automated    C-Reactive Protein   Result Value Ref Range    CRP 0.70 0.00 - 0.90 mg/dL   Cyclic Citrullinated Peptide Antibody, IgG   Result Value Ref Range    CCP Antibodies <0.5 <5.0 U/mL   Hepatitis B Core Antibody, IgM   Result Value Ref Range    Hep B C IgM Negative    Hepatitis B e Antigen   Result Value Ref Range    Hep B E Ag Negative Negative   Hepatitis B Surface Ab, Qualitative   Result Value Ref Range    Hep B S Ab <3.10 IU/L   Hepatitis B Surface Antigen   Result Value Ref Range    Hepatitis B Surface Ag Negative    Hepatitis C Antibody   Result Value Ref Range    Hepatitis C Ab High positive (A)    Rheumatoid Factor   Result Value Ref Range    Rheumatoid Factor 61.1 (H) 0.0 - 15.0 IU/mL   T-SPOT TB Screening Test   Result Value Ref Range    T-SPOT TB Screening Test See result image under hyperlink        Assessment:       1. Hepatitis C carrier    2. Drug-induced immunodeficiency    3. Rheumatoid arthritis involving shoulder with positive rheumatoid factor, unspecified laterality    4. High risk medication use    5. Sciatica associated with disorder of lumbar spine    6. Knee arthropathy    7. Abdominal pain, unspecified abdominal location              Plan:       Diagnoses and all orders for this visit:    Hepatitis C carrier  -     Hepatitis C RNA, Quantitative, PCR; Future  -     Hepatitis C Genotype; Future  -     Gamma GT; Future  -     US Abdomen Complete; Future    Drug-induced immunodeficiency  -     Sedimentation rate; Future  -     C-Reactive Protein; Future  -     Comprehensive Metabolic Panel; Future  -     CBC Auto Differential; Future  -     Hepatitis C RNA, Quantitative, PCR; Future  -     Hepatitis C Genotype; Future  -     Gamma GT; Future  -     US Abdomen Complete; Future    Rheumatoid arthritis  involving shoulder with positive rheumatoid factor, unspecified laterality  -     Sedimentation rate; Future  -     C-Reactive Protein; Future  -     Comprehensive Metabolic Panel; Future  -     CBC Auto Differential; Future  -     Hepatitis C RNA, Quantitative, PCR; Future  -     Hepatitis C Genotype; Future  -     Gamma GT; Future  -     US Abdomen Complete; Future    High risk medication use  -     Sedimentation rate; Future  -     C-Reactive Protein; Future  -     Comprehensive Metabolic Panel; Future  -     CBC Auto Differential; Future  -     Hepatitis C RNA, Quantitative, PCR; Future  -     Hepatitis C Genotype; Future  -     Gamma GT; Future  -     US Abdomen Complete; Future    Sciatica associated with disorder of lumbar spine  -     Sedimentation rate; Future  -     C-Reactive Protein; Future  -     Comprehensive Metabolic Panel; Future  -     CBC Auto Differential; Future  -     Hepatitis C RNA, Quantitative, PCR; Future  -     Hepatitis C Genotype; Future  -     Gamma GT; Future  -     US Abdomen Complete; Future    Knee arthropathy  -     Sedimentation rate; Future  -     C-Reactive Protein; Future  -     Comprehensive Metabolic Panel; Future  -     CBC Auto Differential; Future  -     Gamma GT; Future  -     US Abdomen Complete; Future    Abdominal pain, unspecified abdominal location  -     Gamma GT; Future  -     US Abdomen Complete; Future      may consider xeljanz or rinvoq    More than 50% of the 45 minute encounter was spent face to face counseling the patient regarding current status and future plan of care as well as side of the medications. All questions were answered to patient's satisfaction    The patient location is: home  The chief complaint leading to consultation is: ra    Visit type: audiovisual    Face to Face time with patient: 45   minutes of total time spent on the encounter, which includes face to face time and non-face to face time preparing to see the patient (eg, review of  tests), Obtaining and/or reviewing separately obtained history, Documenting clinical information in the electronic or other health record, Independently interpreting results (not separately reported) and communicating results to the patient/family/caregiver, or Care coordination (not separately reported).         Each patient to whom he or she provides medical services by telemedicine is:  (1) informed of the relationship between the physician and patient and the respective role of any other health care provider with respect to management of the patient; and (2) notified that he or she may decline to receive medical services by telemedicine and may withdraw from such care at any time.    Notes:

## 2023-06-28 DIAGNOSIS — M05.719 RHEUMATOID ARTHRITIS INVOLVING SHOULDER WITH POSITIVE RHEUMATOID FACTOR, UNSPECIFIED LATERALITY: ICD-10-CM

## 2023-06-28 DIAGNOSIS — G89.4 CHRONIC PAIN SYNDROME: ICD-10-CM

## 2023-06-28 DIAGNOSIS — M25.569 KNEE PAIN, UNSPECIFIED CHRONICITY, UNSPECIFIED LATERALITY: ICD-10-CM

## 2023-06-28 DIAGNOSIS — M25.539 PAIN IN WRIST, UNSPECIFIED LATERALITY: ICD-10-CM

## 2023-06-28 DIAGNOSIS — E55.9 VITAMIN D DEFICIENCY, UNSPECIFIED: ICD-10-CM

## 2023-06-28 DIAGNOSIS — M43.26 FUSION OF SPINE OF LUMBAR REGION: ICD-10-CM

## 2023-06-28 RX ORDER — HYDROXYCHLOROQUINE SULFATE 200 MG/1
TABLET, FILM COATED ORAL
Qty: 180 TABLET | Refills: 3 | Status: SHIPPED | OUTPATIENT
Start: 2023-06-28 | End: 2023-07-31

## 2023-06-28 NOTE — TELEPHONE ENCOUNTER
----- Message from Amarilis Chavez sent at 6/27/2023  8:04 AM CDT -----  Contact: spouse  Type:  RX Refill Request    Who Called:  Amber (spouse)  Refill or New Rx:  refill  RX Name and Strength:  hydrOXYchloroQUINE (PLAQUENIL) 200 mg tablet  Lidocaine patches (new)  How is the patient currently taking it? (ex. 1XDay):  as rx'd  Is this a 30 day or 90 day RX:  30  Preferred Pharmacy with phone number:    [id number] - [place] [address] [address] [place] [address] & [facility] [address] [place] [address] [facility] [address] [place] [id number]  Phone: [phone] Fax: [phone]      Local or Mail Order:  local  Ordering Provider:  Marcus Schneider Call Back Number:  [phone]    Additional Information:  Spouse states the Lidocaine is a new rx that the pt never received   thank you

## 2023-06-29 ENCOUNTER — TELEPHONE (OUTPATIENT)
Dept: RHEUMATOLOGY | Facility: CLINIC | Age: 58
End: 2023-06-29
Payer: MEDICARE

## 2023-06-29 DIAGNOSIS — B17.10 ACUTE HEPATITIS C VIRUS INFECTION WITHOUT HEPATIC COMA: Primary | ICD-10-CM

## 2023-06-30 ENCOUNTER — TELEPHONE (OUTPATIENT)
Dept: HEPATOLOGY | Facility: CLINIC | Age: 58
End: 2023-06-30
Payer: MEDICARE

## 2023-06-30 NOTE — TELEPHONE ENCOUNTER
Jessica Ch PA-C ordered that patient be scheduled for a hepatology consult visit for hep c.  Patient hep c quant positive.  I spoke with his wife.  Appt with PA Scheuermann scheduled 7/6/23; reminder notice mailed.

## 2023-07-05 DIAGNOSIS — M43.26 FUSION OF SPINE OF LUMBAR REGION: ICD-10-CM

## 2023-07-05 DIAGNOSIS — M53.86 SCIATICA ASSOCIATED WITH DISORDER OF LUMBAR SPINE: Primary | ICD-10-CM

## 2023-07-05 NOTE — TELEPHONE ENCOUNTER
----- Message from Gabriel Walters sent at 7/5/2023  7:50 AM CDT -----  Contact: self  Type: RX Refill Request        Who Called: Patient    Refill or New Rx: refill  RX Name and Strength: lidocaine patch   How is the patient currently taking it? (ex. 1XDay): as directed   Is this a 30 day or 90 day RX: n/a  Preferred Pharmacy with phone number:   [id number] [place] [address] [employer] [address] [place] [address] & [employer] [facility] [address] [place] [id number]  Phone: [phone] Fax: [phone]  Local or Mail Order: local   Ordering Provider: Dr. Marcus Schneider Call Back Number: [phone]  Additional Information: Pt is completley out need this filled today having issues. Thanks

## 2023-07-06 ENCOUNTER — OFFICE VISIT (OUTPATIENT)
Dept: HEPATOLOGY | Facility: CLINIC | Age: 58
End: 2023-07-06
Payer: MEDICARE

## 2023-07-06 ENCOUNTER — TELEPHONE (OUTPATIENT)
Dept: HEPATOLOGY | Facility: CLINIC | Age: 58
End: 2023-07-06
Payer: MEDICARE

## 2023-07-06 DIAGNOSIS — B18.2 CHRONIC HEPATITIS C WITHOUT HEPATIC COMA: Primary | ICD-10-CM

## 2023-07-06 DIAGNOSIS — B17.10 ACUTE HEPATITIS C VIRUS INFECTION WITHOUT HEPATIC COMA: ICD-10-CM

## 2023-07-06 DIAGNOSIS — R10.31 RLQ ABDOMINAL PAIN: ICD-10-CM

## 2023-07-06 PROCEDURE — 99203 OFFICE O/P NEW LOW 30 MIN: CPT | Mod: 95,,, | Performed by: PHYSICIAN ASSISTANT

## 2023-07-06 PROCEDURE — 1160F RVW MEDS BY RX/DR IN RCRD: CPT | Mod: CPTII,95,, | Performed by: PHYSICIAN ASSISTANT

## 2023-07-06 PROCEDURE — 1159F MED LIST DOCD IN RCRD: CPT | Mod: CPTII,95,, | Performed by: PHYSICIAN ASSISTANT

## 2023-07-06 PROCEDURE — 1160F PR REVIEW ALL MEDS BY PRESCRIBER/CLIN PHARMACIST DOCUMENTED: ICD-10-PCS | Mod: CPTII,95,, | Performed by: PHYSICIAN ASSISTANT

## 2023-07-06 PROCEDURE — 99203 PR OFFICE/OUTPT VISIT, NEW, LEVL III, 30-44 MIN: ICD-10-PCS | Mod: 95,,, | Performed by: PHYSICIAN ASSISTANT

## 2023-07-06 PROCEDURE — 1159F PR MEDICATION LIST DOCUMENTED IN MEDICAL RECORD: ICD-10-PCS | Mod: CPTII,95,, | Performed by: PHYSICIAN ASSISTANT

## 2023-07-06 RX ORDER — LIDOCAINE 50 MG/G
1 PATCH TOPICAL DAILY
Qty: 30 PATCH | Refills: 5 | Status: SHIPPED | OUTPATIENT
Start: 2023-07-06 | End: 2024-01-02

## 2023-07-06 NOTE — PATIENT INSTRUCTIONS
Hello again! Nice meeting you today!    Due to your HCV infection, the following things are recommended:  AVOID ALL alcohol (includes beer, wine and liquor)  Tylenol/acetaminophen is OKAY for pain, but no more than 2000 mg per day (up to 4 extra strength)  NO RAW SEAFOOD (sushi, raw oysters) due to the risk of infection    Do not share things that could cut you, such as a razor or toothbrush.  Sexual partners should be screened for HCV.  Although it is unlikely that your children would have gotten HCV from you, it is recommended that everyone over age 18 be screened.     Plan:  Blood work  Ultrasound  FibroScan (to measure liver scarring)  Follow up visit with me    Hopefully we'll have all the info we need to talk about HCV treatment at your next visit!      Jen Scheuermann, PA-C  Advanced Practice Provider  Hepatology - HCV Clinic

## 2023-07-06 NOTE — PROGRESS NOTES
HEPATOLOGY VIDEO VISIT NOTE - HCV clinic  Visit type: audiovisual  Converted to audio only due to technical issues    Each patient to whom he or she provides medical services by telemedicine is:  (1) informed of the relationship between the physician and patient and the respective role of any other health care provider with respect to management of the patient; and (2) notified that he or she may decline to receive medical services by telemedicine and may withdraw from such care at any time.      REFERRING PROVIDER: Jessica PEREZ  CHIEF COMPLAINT: Hepatitis C   (accompanied by: wife)    HISTORY       This is a 58 y.o. White male w/ newly diagnosed RA, seeing derm, referred for HCV      HCV history:  Recently diagnosed  Prior icteric illnesses: None  - Treatment naive  - Genotype 1a or 1b  - HCV RNA > 3 mill IU/mL - 6/2023    Liver staging:  No formal liver staging  No recent liver imaging.   Labs - no evidence of compromised liver function; liver panel and plt normal      Denies jaundice, dark urine, abdominal distention, hematemesis, melena, slowed mentation.       PMH, PSH, SOCIAL HX, FAMILY HX      Reviewed in Epic  Pertinent findings:  FAMILY HX: sister w/ HCV, now cured  SOCIAL HX: Resides in [place].   Alcohol - no. Denies hx of heavy use  Drugs - no      ROS: as per HPI, in addition:  RLQ abd pain      PHYSICAL EXAM:  Friendly White male, in no acute distress; alert and oriented to person, place and time  LUNGS: Normal respiratory effort.  NEURO/PSYCH: Memory intact. Thought and speech pattern appropriate. Behavior normal. No depression or anxiety noted.      PERTINENT DIAGNOSTIC RESULTS      Lab Results   Component Value Date    WBC 7.50 06/13/2023    HGB 14.0 06/13/2023     06/13/2023     Lab Results   Component Value Date    INR 1.1 02/11/2019     Lab Results   Component Value Date    AST 33 06/13/2023    ALT 33 06/13/2023    BILITOT 1.3 06/13/2023    ALBUMIN 4.3 06/13/2023    ALKPHOS 75  06/13/2023    CREATININE 0.95 06/13/2023    BUN 26 (H) 06/13/2023     06/13/2023    K 4.6 06/13/2023         ASSESSMENT        58 y.o. White male with:  1. CHRONIC HEPATITIS C, GENOTYPE 1a or 1b- treatment naive  -- Unknown Immunity to HAV & HBV      PLAN        1. Labs, FibroScan, U/S and f/u visit on same day. Goal of antiviral therapy  Orders Placed This Encounter   Procedures    FibroScan (Vibration Controlled Transient Elastography)    US Abdomen Complete    Hepatitis B Core Antibody, Total    Hepatitis A antibody, IgG    HIV 1/2 Ag/Ab (4th Gen)         ___________________________________________________________________  EDUCATION:  The natural history of Hepatitis C, including potential progression to cirrhosis was reviewed. We discussed the increased progression of liver disease secondary to alcohol use; patient was advised to avoid alcohol completely.     Transmission of Hepatitis C was reviewed, including possible sexual transmission. Sexual contacts should be screened. Patient should avoid sharing personal products such as razors, toothbrushes, etc.     Recommend avoiding raw seafood.  Limit acetaminophen to 2000mg daily.  __________________________________________________________________    Duration of encounter: 37 min  This includes face-to-face time and non face-to-face time preparing to see the patient (eg, review of tests), obtaining and/or reviewing separately obtained history, documenting clinical information in the electronic or other health record, independently interpreting resultsand communicating results to the patient/family/caregiver, or care coordination.

## 2023-07-06 NOTE — TELEPHONE ENCOUNTER
----- Message from Jennifer B. Scheuermann, PA sent at 7/6/2023  3:07 PM CDT -----  Pls schedule labs, u/s, fibroscan, visit - all on same day

## 2023-07-24 ENCOUNTER — TELEPHONE (OUTPATIENT)
Dept: HEPATOLOGY | Facility: CLINIC | Age: 58
End: 2023-07-24
Payer: MEDICARE

## 2023-07-24 NOTE — TELEPHONE ENCOUNTER
----- Message from Mateusz Fitzgerald sent at 7/24/2023  9:46 AM CDT -----  Regarding: call back  Pt's wife call to speak with someone in regards to some question she has about the appt requesting call back    Call

## 2023-07-31 ENCOUNTER — PROCEDURE VISIT (OUTPATIENT)
Dept: HEPATOLOGY | Facility: CLINIC | Age: 58
End: 2023-07-31
Payer: MEDICARE

## 2023-07-31 ENCOUNTER — OFFICE VISIT (OUTPATIENT)
Dept: HEPATOLOGY | Facility: CLINIC | Age: 58
End: 2023-07-31
Payer: MEDICARE

## 2023-07-31 ENCOUNTER — HOSPITAL ENCOUNTER (OUTPATIENT)
Dept: RADIOLOGY | Facility: HOSPITAL | Age: 58
Discharge: HOME | End: 2023-07-31
Attending: PHYSICIAN ASSISTANT
Payer: MEDICARE

## 2023-07-31 VITALS — RESPIRATION RATE: 18 BRPM | WEIGHT: 190 LBS | HEIGHT: 72 IN | BODY MASS INDEX: 25.73 KG/M2

## 2023-07-31 DIAGNOSIS — B18.2 CHRONIC HEPATITIS C WITHOUT HEPATIC COMA: ICD-10-CM

## 2023-07-31 DIAGNOSIS — R10.31 RLQ ABDOMINAL PAIN: ICD-10-CM

## 2023-07-31 DIAGNOSIS — B18.2 CHRONIC HEPATITIS C WITHOUT HEPATIC COMA: Primary | ICD-10-CM

## 2023-07-31 PROCEDURE — 99214 OFFICE O/P EST MOD 30 MIN: CPT | Mod: S$GLB,,, | Performed by: PHYSICIAN ASSISTANT

## 2023-07-31 PROCEDURE — 76700 US EXAM ABDOM COMPLETE: CPT | Mod: TC

## 2023-07-31 PROCEDURE — 76981 FIBROSCAN (VIBRATION CONTROLLED TRANSIENT ELASTOGRAPHY): ICD-10-PCS | Mod: S$GLB,,, | Performed by: PHYSICIAN ASSISTANT

## 2023-07-31 PROCEDURE — 76981 USE PARENCHYMA: CPT | Mod: S$GLB,,, | Performed by: PHYSICIAN ASSISTANT

## 2023-07-31 PROCEDURE — 99214 PR OFFICE/OUTPT VISIT, EST, LEVL IV, 30-39 MIN: ICD-10-PCS | Mod: S$GLB,,, | Performed by: PHYSICIAN ASSISTANT

## 2023-07-31 PROCEDURE — 3008F PR BODY MASS INDEX (BMI) DOCUMENTED: ICD-10-PCS | Mod: CPTII,S$GLB,, | Performed by: PHYSICIAN ASSISTANT

## 2023-07-31 PROCEDURE — 99999 PR PBB SHADOW E&M-EST. PATIENT-LVL II: ICD-10-PCS | Mod: PBBFAC,,, | Performed by: PHYSICIAN ASSISTANT

## 2023-07-31 PROCEDURE — 3008F BODY MASS INDEX DOCD: CPT | Mod: CPTII,S$GLB,, | Performed by: PHYSICIAN ASSISTANT

## 2023-07-31 PROCEDURE — 99999 PR PBB SHADOW E&M-EST. PATIENT-LVL II: CPT | Mod: PBBFAC,,, | Performed by: PHYSICIAN ASSISTANT

## 2023-07-31 PROCEDURE — 76700 US ABDOMEN COMPLETE: ICD-10-PCS | Mod: 26,,, | Performed by: INTERNAL MEDICINE

## 2023-07-31 PROCEDURE — 76700 US EXAM ABDOM COMPLETE: CPT | Mod: 26,,, | Performed by: INTERNAL MEDICINE

## 2023-07-31 RX ORDER — VELPATASVIR AND SOFOSBUVIR 100; 400 MG/1; MG/1
1 TABLET, FILM COATED ORAL DAILY
Qty: 28 TABLET | Refills: 2 | Status: ACTIVE | OUTPATIENT
Start: 2023-07-31 | End: 2024-03-28 | Stop reason: ALTCHOICE

## 2023-07-31 NOTE — PROCEDURES
FibroScan (Vibration Controlled Transient Elastography)    Date/Time: 7/31/2023 11:15 AM    Performed by: Jennifer B. Scheuermann, PA  Authorized by: Jennifer B. Scheuermann, PA    Diagnosis:  HCV    Probe:  M    Universal Protocol: Patient's identity, procedure and site were verified, confirmatory pause was performed.  Discussed procedure including risks and potential complications.  Questions answered.  Patient verbalizes understanding and wishes to proceed with VCTE.     Procedure: After providing explanations of the procedure, patient was placed in the supine position with right arm in maximum abduction to allow optimal exposure of right lateral abdomen.  Patient was briefly assessed, Testing was performed in the mid-axillary location, 50Hz Shear Wave pulses were applied and the resulting Shear Wave and Propagation Speed detected with a 3.5 MHz ultrasonic signal, using the FibroScan probe, Skin to liver capsule distance and liver parenchyma were accessed during the entire examination with the FibroScan probe, Patient was instructed to breathe normally and to abstain from sudden movements during the procedure, allowing for random measurements of liver stiffness. At least 10 Shear Waves were produced, Individual measurements of each Shear Wave were calculated.  Patient tolerated the procedure well with no complications.  Meets discharge criteria as was dismissed.  Rates pain 0 out of 10.  Patient will follow up with ordering provider to review results.    Findings  Median liver stiffness score:  5.9  CAP Reading: dB/m:  253    IQR/med %:  12  Interpretation  Fibrosis interpretation is based on medial liver stiffness - Kilopascal (kPa).    Fibrosis Stage:  F 0-1  Steatosis interpretation is based on controlled attenuation parameter - (dB/m).    Steatosis Grade:  S1

## 2023-07-31 NOTE — PROGRESS NOTES
HEPATOLOGY VIDEO VISIT NOTE - HCV clinic  Visit type: audiovisual  Converted to audio only due to technical issues    Each patient to whom he or she provides medical services by telemedicine is:  (1) informed of the relationship between the physician and patient and the respective role of any other health care provider with respect to management of the patient; and (2) notified that he or she may decline to receive medical services by telemedicine and may withdraw from such care at any time.      REFERRING PROVIDER: Jessica PEREZ  CHIEF COMPLAINT: Hepatitis C   (accompanied by: wife)    HISTORY       This is a 58 y.o. White male w/ newly diagnosed RA, here for f/u regarding HCV    Being seen for f/u w/ additional labs, imaging, liver staging.  Unknown HAV/HBV exposure - labs scheduled today  HIV screen scheduled today  No evidence of advanced fibrosis.    Feels well  Motivated to have HCV treated  Denies jaundice, dark urine, hematemesis, melena, slowed mentation, abdominal distention.         HCV history:  Recently diagnosed  Prior icteric illnesses: None  - Treatment naive  - Genotype 1a or 1b  - HCV RNA > 3 mill IU/mL - 6/2023    Liver staging:  FibroScan today - kPa 5.9 (F0-1), , S1  FIB-4 Calculation: 1.34 - 7/31/2023 -- not indicative of cirrhosis    Labs / imaging support staging  U/S today unremarkable  Labs - no evidence of compromised liver function; liver panel and plt normal      PMH, PSH, SOCIAL HX, FAMILY HX      Reviewed in Epic  Pertinent findings:  FAMILY HX: sister w/ HCV, now cured  SOCIAL HX: Resides in [place].   Alcohol - no. Denies hx of heavy use  Drugs - no      ROS: as per HPI, in addition:  Back pain, doing better now w/ pain control (Lidocaine patch)      PHYSICAL EXAM: Friendly WM  in no acute distress. Alert and oriented.   HEENT: Sclerae anicteric.   LUNGS: Normal respiratory effort.  ABDOMEN: Nondistended. Soft. Nontender.   EXTREMITIES: No edema.   SKIN: No jaundice. No  rashes on exposed skin  NEURO/PSYCH: Normal gait. Memory intact. Thought and speech patterns appropriate. No obvious depression or anxiety.       PERTINENT DIAGNOSTIC RESULTS      Lab Results   Component Value Date    WBC 7.50 06/13/2023    HGB 14.0 06/13/2023     06/13/2023     Lab Results   Component Value Date    INR 1.1 02/11/2019     Lab Results   Component Value Date    AST 33 06/13/2023    ALT 33 06/13/2023    BILITOT 1.3 06/13/2023    ALBUMIN 4.3 06/13/2023    ALKPHOS 75 06/13/2023    CREATININE 0.95 06/13/2023    BUN 26 (H) 06/13/2023     06/13/2023    K 4.6 06/13/2023     Results for orders placed during the hospital encounter of 07/31/23  US Abdomen CompleteCLINICAL HISTORY:  HCV; with spleen size for portal HTN assessment; RLQ pain; Chronic viral hepatitis C  TECHNIQUE:Complete abdominal ultrasound (including pancreas, liver, gallbladder, common bile duct, spleen, aorta, IVC, and kidneys) was performed.  COMPARISON:None    FINDINGS:  The visualized pancreas is within normal limits.  The aorta is not aneurysmal.  The gallbladder demonstrates no gallstone.  The common duct is not dilated, 2 mm.  Visualized inferior vena cava is unremarkable.  The liver size measures 15.9 cm.  The liver parenchyma demonstrates no abnormality.  The right kidney measures 11.4 cm and has a normal sonographic appearance.  The spleen is not enlarged, 8.9 cm.  Left kidney measures 11.3 cm and has a normal sonographic appearance.    Impression  No significant abnormality demonstrated      ASSESSMENT        58 y.o. White male with:  1. CHRONIC HEPATITIS C, GENOTYPE 1a or 1b- treatment naive  -- FibroScan F0-1  -- Unknown Immunity to HAV & HBV    2. RECENTLY DIAGNOSED RHEUMATOID ARTHRITIS      PLAN        1. Await pending labs from today  2. Obtain authorization to treat HCV with Epclusa x 12 weeks  -- Rx will be routed to [facility]  -- Patient will notify me of exact treatment start date so appropriate  lab f/u can be scheduled.  3. Will vaccinate for HAV/HBV accordingly after lab review  4. If HBcAb pos, may need HBV prophylaxis vs monitoring based on which meds are prescribed by Rheum    ADDENDUM:  HAVAB neg  HBcAb neg  HIV neg    Needs twinrix - will schedule  No prior HBV exposure. After HCV is cured, no add'l hepatology f/u needed.        ______________________________________________________________________________  EDUCATION:  HCV RX  Discussed goal of HCV eradication to prevent progression of liver disease.  Discussed use of Epclusa daily x 12 weeks w/ potential side effects of fatigue and headache.     Reviewed limitations on acid suppressant medications due to DDI w/ Epclusa:  -- Antacids, H2 Receptor Antagonist, PPI - Pt not taking  Patient instructed to contact me if experiencing acid related symptoms so further recommendations can be made regarding acid suppression therapy.      Herbal / alternative therapies must be discontinued  Discussed importance of medication adherence and risk of treatment failure / viral resistance if not adherent. Pt has verbalized understanding.    __________________________________________________________________    Duration of encounter: 31 min  This includes face-to-face time and non face-to-face time preparing to see the patient (eg, review of tests), obtaining and/or reviewing separately obtained history, documenting clinical information in the electronic or other health record, independently interpreting resultsand communicating results to the patient/family/caregiver, or care coordination.

## 2023-08-01 ENCOUNTER — TELEPHONE (OUTPATIENT)
Dept: HEPATOLOGY | Facility: CLINIC | Age: 58
End: 2023-08-01
Payer: MEDICARE

## 2023-08-01 NOTE — TELEPHONE ENCOUNTER
----- Message from Jennifer B. Scheuermann, PA sent at 7/31/2023  4:03 PM CDT -----  Pls schedule twinrix. Or he can see PCP if that is easier for him. Thx.

## 2023-08-01 NOTE — TELEPHONE ENCOUNTER
I spoke with patient's wife and msg from PA Scheuermann relayed.  She states that her  will try to get Twinrix vaccine series at PCP's office but will call hepatology back for scheduling in [place] if that's not an option.

## 2023-08-03 ENCOUNTER — TELEPHONE (OUTPATIENT)
Dept: PHARMACY | Facility: CLINIC | Age: 58
End: 2023-08-03
Payer: MEDICARE

## 2023-08-03 NOTE — TELEPHONE ENCOUNTER
Karen, this is Monalisa Vasquez, clinical pharmacist with [facility] that is part of your care team.  We have begun working on your prescription [EPCLUSA] that your doctor has sent us. Our next steps include:     Working with your insurance company to obtain approval for your medication  Working with you to ensure your medication is affordable     We will be calling you along the way with updates on your medication but if you have any concerns or receive information that you would like to discuss please reach us at [phone].    Welcome call outcome: Patient/caregiver reached

## 2023-08-08 ENCOUNTER — SPECIALTY PHARMACY (OUTPATIENT)
Dept: PHARMACY | Facility: CLINIC | Age: 58
End: 2023-08-08
Payer: MEDICARE

## 2023-08-08 NOTE — TELEPHONE ENCOUNTER
Specialty Pharmacy - Initial Clinical Assessment    Specialty Medication Orders Linked to Encounter      Flowsheet Row Most Recent Value   Medication #1 sofosbuvir-velpatasvir 400-100 mg Tab (Order#[id number], Rx#[id number])          Patient Diagnosis   B18.2 - Chronic hepatitis C    Subjective    Lokesh Melchor is a 58 y.o. male, who is followed by the specialty pharmacy service for management and education.    Recent Encounters       Date Type Provider Description    08/08/2023 Specialty Pharmacy Monalisa Vasquez PharmD Initial Clinical Assessment    08/08/2023 Specialty Pharmacy Monalisa Vasquez PharmD Referral Authorization            Current Outpatient Medications   Medication Sig    LIDOcaine (LIDODERM) 5 % Place 1 patch onto the skin once daily. Remove & Discard patch within 12 hours or as directed by MD    methylPREDNISolone (MEDROL) 4 MG Tab Take by mouth.    sofosbuvir-velpatasvir 400-100 mg Tab Take 1 tablet by mouth once daily.    SUBOXONE 4-1 mg Film Place 1 Film inside cheek once daily.    Last reviewed on 8/8/2023  2:31 PM by Monalisa Vasquez, PharmD    Review of patient's allergies indicates:   Allergen Reactions    Codeine Rash   Last reviewed on  7/31/2023 11:55 AM by Jennifer B Scheuermann    Drug Interactions    Drug interactions evaluated: yes  Clinically relevant drug interactions identified: no                     Adverse Effects          *All other systems reviewed and are negative       Assessment Questions - Documented Responses      Flowsheet Row Most Recent Value   Assessment    Medication Reconciliation completed for patient Yes   During the past 4 weeks, has patient missed any activities due to condition or medication? No   During the past 4 weeks, did patient have any of the following urgent care visits? None   Goals of Therapy Status Discussed (new start)   Status of the patients ability to self-administer: Is Able   All education points have been covered with patient? Yes,  supplemental printed education provided   Welcome packet contents reviewed and discussed with patient? Yes   Assesment completed? Yes   Plan Therapy being initiated   Do you need to open a clinical intervention (i-vent)? No   Do you want to schedule first shipment? Yes   Medication #1 Assessment Info    Patient status New medication, New to [facility]   Is this medication appropriate for the patient? Yes   Is this medication effective? Not yet started          Refill Questions - Documented Responses      Flowsheet Row Most Recent Value   Patient Availability and HIPAA Verification    Does patient want to proceed with activity? Yes   HIPAA/medical authority confirmed? Yes   Relationship to patient of person spoken to? Spouse/Significant Other  [Amber and patient were both on the line. [facility] on speaker.]   Refill Screening Questions    When does the patient need to receive the medication? 08/11/23   Refill Delivery Questions    How will the patient receive the medication? MEDRx   When does the patient need to receive the medication? 08/11/23   Shipping Address Home   Address in [facility] confirmed and updated if neccessary? Yes   Expected Copay ($) 0   Is the patient able to afford the medication copay? Yes   Payment Method zero copay   Days supply of Refill 28   Supplies needed? No supplies needed   Refill activity completed? Yes   Refill activity plan Refill scheduled   Shipment/Pickup Date: 08/09/23            Objective    He has a past medical history of Chronic hepatitis C, Epididymitis, Rheumatoid arthritis, Sciatica, and UTI (urinary tract infection).    Tried/failed medications: NONE    BP Readings from Last 4 Encounters:   05/30/23 (!) 168/100   10/11/22 (!) 142/80   11/30/21 (!) 157/82   07/14/19 (!) 141/83     Ht Readings from Last 4 Encounters:   07/31/23 6' (1.829 m)   05/30/23 6' (1.829 m)   10/11/22 6' (1.829 m)   11/30/21 6' (1.829 m)     Wt Readings from Last 4 Encounters:   07/31/23 86.2 kg (190 lb)  "  05/30/23 85.7 kg (189 lb)   10/11/22 78 kg (172 lb)   11/30/21 78 kg (172 lb)     No results found for: "HCVGENOTYPE"  Recent Labs   Lab Result Units 07/31/23  1141 06/13/23  1039 05/30/23  1530   Creatinine mg/dL  --  0.95 0.95   ALT U/L  --  33 29   AST U/L  --  33 29   Hepatitis B Surface Ag   --   --  Negative   Hep B S Ab IU/L  --   --  <3.10   Hep B Core Total Ab  Non-reactive  --   --      The goals of Hepatitis C Virus (HCV) infection treatment include:  Reducing all-cause mortality and liver-related health adverse consequences, including cirrhosis, end-stage liver disease, and hepatocellular carcinoma  Achieving virologic cure as evidenced by a sustained virologic response  Improving or maintaining quality of life  Maintaining optimal therapy adherence  Minimizing and managing side effects  Preventing the transmission of HCV      Goals of Therapy Status: Discussed (new start)    Assessment/Plan  Patient plans to start therapy on 08/11/23      Indication, dosage, appropriateness, effectiveness, safety and convenience of his specialty medication(s) were reviewed today.     Patient Education   Patient received education on the following:   Expectations and possible outcomes of therapy  Proper use, timely administration, and missed dose management  Duration of therapy  Side effects, including prevention, minimization, and management  Contraindications and safety precautions  New or changed medications, including prescribe and over the counter medications and supplements  Reviews recommended vaccinations, as appropriate  Storage, safe handling, and disposal    AG Epclusa 400/100mg - Take one tablet by mouth daily x 12 weeks  Counseling was reviewed:  Patient MUST take Epclusa at the SAME time every day.  Patient MUST avoid acid reducers without consulting with myself or provider first.   Potential Side effects include: Headaches and fatigue. Headache: Patient may treat with OTC remedies. If Tylenol is used, " dose should not exceed 2000mg per day.   Allergies reviewed and medication reconciliation complete (reviewed and documented in [facility]). Patient MUST contact myself or provider prior to starting any new OTC, herbal, or prescription drugs to avoid potential DDIs.    DDI: Medication list reviewed and potential DDIs addressed.    Clinical Review:  Diagnosis: Chronic hepatitis C without hepatic coma [B18.2]  Weeks: 12 weeks   Genotype: 1a/1b  HCV RNA: 3,508,971 IU/mL (6/13/23)   Fibrosis: F0-1  CP: A   Renal: eGFR >60 mL/min   Treatment: NAIVE  HBV: (-) serologies   Appropriate based on AASLD guidelines: Appropriate.     Patient plans to start Epclusa 8/11/23.     Wife states patient has back surgery scheduled on 9/9/23. Advised there is no reason to hold start of Epclusa treatment and usually anaesthesilogy and pain meds used during/after surgery have no DDIs with Epclusa therapy. However, wife is more than welcome to call [facility] to check for DDIs. Advised to bring Epclusa to the hospital at time of surgery in case patient stays a few days and since Epclusa will not be on hospital formulary.     Tasks added this encounter   8/18/2023 - New Therapy Check-in   Tasks due within next 3 months   No tasks due.     Monalisa Vasquez, PharmD  [facility]  [address] [place] [id number]  Phone: [phone]  Fax: [phone]

## 2023-08-09 ENCOUNTER — TELEPHONE (OUTPATIENT)
Dept: HEPATOLOGY | Facility: CLINIC | Age: 58
End: 2023-08-09
Payer: MEDICARE

## 2023-08-09 DIAGNOSIS — B18.2 CHRONIC HEPATITIS C WITHOUT HEPATIC COMA: Primary | ICD-10-CM

## 2023-08-10 NOTE — TELEPHONE ENCOUNTER
Pt beginning 12 weeks of Epclusa on 8/11/23  Anticipated treatment end date: 11/2/23  F 0-1  Mally 1a/b  Treatment naive        Please review with patient:  Hepatitis C treatment with Epclusa will last 12 weeks. This means TWO more shipments of Epclusa will come from pharmacy. If he has any problems getting future shipments call us right away!  After treatment ends there is a small chance the virus can return. We will do lab work 3 months after treatment ends to see if virus is cured.    Please update episode & schedule labs to see if the virus is cured  - LFT, HCV RNA - SVR12 - 2/2/23      thanks

## 2023-08-10 NOTE — TELEPHONE ENCOUNTER
I spoke with patient's wife and msg from PA Scheuermann relayed.  Msg mailed to patient.  He will have testing done at [place].  Wife states that she will bring notice from PA Scheuermann to make sure correct blood work is done in February.

## 2023-08-16 ENCOUNTER — TELEPHONE (OUTPATIENT)
Dept: HEPATOLOGY | Facility: CLINIC | Age: 58
End: 2023-08-16
Payer: MEDICARE

## 2023-08-16 NOTE — TELEPHONE ENCOUNTER
----- Message from Yudy Chester sent at 8/16/2023 10:55 AM CDT -----  Regarding: Patient advice  Contact: Amber  [phone]      Name of Caller: Amber     Contact Preference:  [phone]    Nature of Call: Patient wife requesting a call back to discuss a new medication wants to make sure it is okay for Pt to take

## 2023-08-16 NOTE — TELEPHONE ENCOUNTER
I spoke with patient's wife.  She states that he has pretty much had a headache since starting Epclusa.  Patient taking med a night right before going to bed but is waking up with a headache.  I told her that he should take medication with a meal to hopefully lessen side effect.  He is going to try OTC Aleve or Tylenol to help with symptom and call hepatology back if OTC not working.

## 2023-08-16 NOTE — TELEPHONE ENCOUNTER
Pls also tell him to stay very well dehydrated b/c any level of dehydration will worsen / cause HA and w/ current temps this is certainly a concern.    Also, reassure that I've had several people w/ headaches in beginning that wear off. But to please call us back if continued issues    thanks

## 2023-08-18 ENCOUNTER — SPECIALTY PHARMACY (OUTPATIENT)
Dept: PHARMACY | Facility: CLINIC | Age: 58
End: 2023-08-18
Payer: MEDICARE

## 2023-08-18 NOTE — TELEPHONE ENCOUNTER
7 Day Touchbase - Documented Responses      Flowsheet Row Most Recent Value   Have you started taking your medication? Yes   What day did you start? 08/11/23   How are you feeling?  Patient has been experiencing bad headaches since beginning treatment. Sometimes the headaches will cause nausea. This has been escalated to clinic staff. Recommended the pt take Tylenol PRN <2000 mg per day for headache relief. The patient has taken Ibuprofen with positive relief. NSAID use is appropriate since there is no hepatic cirrhosis noted nor are there notes of any cardiac issues. Also informed Amber that patient should keep up with water intake. Patient is doing well this AM with no headaches as of yet.   How are you taking your medication? Are you having any problems taking your medication? Patient is taking AG Epclusa 1 tablet daily in the PM before bed. No missed doses since beginning treatment.   Do you have any questions or concerns that we can help you with today? Amber wanted to know if Epclusa can cause kidney impairment. Kidney dysfunction is not a listed side effect per package labeling.          Monalisa Vasquez, PharmD  [facility]  [address] [facility] [address]  Phone: [phone]  Fax: [phone]

## 2023-10-06 ENCOUNTER — TELEPHONE (OUTPATIENT)
Dept: RHEUMATOLOGY | Facility: CLINIC | Age: 58
End: 2023-10-06
Payer: MEDICARE

## 2023-10-06 NOTE — TELEPHONE ENCOUNTER
Called patient no answer  ----- Message from Barbara Bean sent at 10/4/2023  2:07 PM CDT -----  Type:  Needs Medical Advice    Who Called: pt wife beatriz Schneider Call Back Number: [phone] (home)     Additional Information:  Requesting call back  requesting virtual visit on  10/10     please advise thank you         good, to achieve stated therapy goals

## 2023-10-10 ENCOUNTER — OFFICE VISIT (OUTPATIENT)
Dept: RHEUMATOLOGY | Facility: CLINIC | Age: 58
End: 2023-10-10
Payer: MEDICARE

## 2023-10-10 DIAGNOSIS — Z79.52 CURRENT CHRONIC USE OF SYSTEMIC STEROIDS: ICD-10-CM

## 2023-10-10 DIAGNOSIS — M05.9 SEROPOSITIVE RHEUMATOID ARTHRITIS: Primary | ICD-10-CM

## 2023-10-10 DIAGNOSIS — B17.10 ACUTE HEPATITIS C VIRUS INFECTION WITHOUT HEPATIC COMA: ICD-10-CM

## 2023-10-10 PROCEDURE — 1160F PR REVIEW ALL MEDS BY PRESCRIBER/CLIN PHARMACIST DOCUMENTED: ICD-10-PCS | Mod: CPTII,95,, | Performed by: PHYSICIAN ASSISTANT

## 2023-10-10 PROCEDURE — 99214 OFFICE O/P EST MOD 30 MIN: CPT | Mod: 95,,, | Performed by: PHYSICIAN ASSISTANT

## 2023-10-10 PROCEDURE — 1160F RVW MEDS BY RX/DR IN RCRD: CPT | Mod: CPTII,95,, | Performed by: PHYSICIAN ASSISTANT

## 2023-10-10 PROCEDURE — 1159F MED LIST DOCD IN RCRD: CPT | Mod: CPTII,95,, | Performed by: PHYSICIAN ASSISTANT

## 2023-10-10 PROCEDURE — 99214 PR OFFICE/OUTPT VISIT, EST, LEVL IV, 30-39 MIN: ICD-10-PCS | Mod: 95,,, | Performed by: PHYSICIAN ASSISTANT

## 2023-10-10 PROCEDURE — 1159F PR MEDICATION LIST DOCUMENTED IN MEDICAL RECORD: ICD-10-PCS | Mod: CPTII,95,, | Performed by: PHYSICIAN ASSISTANT

## 2023-10-10 NOTE — PROGRESS NOTES
The patient location is: home  The chief complaint leading to consultation is: RA    Visit type: audiovisual    Face to Face time with patient: 20  30 minutes of total time spent on the encounter, which includes face to face time and non-face to face time preparing to see the patient (eg, review of tests), Obtaining and/or reviewing separately obtained history, Documenting clinical information in the electronic or other health record, Independently interpreting results (not separately reported) and communicating results to the patient/family/caregiver, or Care coordination (not separately reported).   Each patient to whom he or she provides medical services by telemedicine is:  (1) informed of the relationship between the physician and patient and the respective role of any other health care provider with respect to management of the patient; and (2) notified that he or she may decline to receive medical services by telemedicine and may withdraw from such care at any time.    Notes:     Subjective:       Patient ID: Lokesh Melchor is a 58 y.o. male.    Chief Complaint: Disease Management    Mr. Melchor is a 58 year old male who presents to telemedicine virtual visit for follow up on seropositive rheumatoid arthritis. He is a new patient to me. On pre-biologic work up hep C+ and referred to Hepatology. He was started on treatment of Epclusa 8/11/23 for 12 week regimen. He also underwent multiple level cervical fusion approx 1 month ago with Dr. Mcgrath. He had increased pain in his thoracic spine and started medrol dose pack per Neuro and then since it worked so well he started taking medrol 4 mg daily. He states previously he was taking medrol only PRN for flares. He feels daily medrol has helped significantly with his daily pain.     Current treatment:  1. Medrol    Prior treatment:  1. Plaquenil      Review of Systems   Constitutional:  Negative for fever and unexpected weight change.   HENT:  Negative  for mouth sores and trouble swallowing.    Eyes:  Negative for redness.   Respiratory:  Negative for cough and shortness of breath.    Cardiovascular:  Negative for chest pain.   Gastrointestinal:  Negative for constipation and diarrhea.   Genitourinary:  Negative for genital sores.   Musculoskeletal:  Positive for arthralgias and neck pain.   Skin:  Negative for rash.   Neurological:  Negative for headaches.   Hematological:  Does not bruise/bleed easily.         Objective:   There were no vitals filed for this visit.    Past Medical History:   Diagnosis Date    Chronic hepatitis C     Epididymitis     Rheumatoid arthritis     Sciatica     UTI (urinary tract infection)      Past Surgical History:   Procedure Laterality Date    BACK SURGERY      x 3    CIRCUMCISION      KNEE SURGERY      MCL repair          Physical Exam   Constitutional: He is oriented to person, place, and time.   Neurological: He is oriented to person, place, and time.       Labs:  Component      Latest Ref [facility] 5/30/2023   WBC      3.90 - 12.70 K/uL    RBC      4.60 - 6.20 M/uL    Hemoglobin      14.0 - 18.0 g/dL    Hematocrit      40.0 - 54.0 %    MCV      82 - 98 fL    MCH      27.0 - 31.0 pg    MCHC      32.0 - 36.0 g/dL    RDW      11.5 - 14.5 %    Platelet Count      150 - 450 K/uL    MPV      9.2 - 12.9 fL    Immature Granulocytes      0.0 - 0.5 %    Gran # (ANC)      1.8 - 7.7 K/uL    Immature Grans (Abs)      0.00 - 0.04 K/uL    Lymph #      1.0 - 4.8 K/uL    Mono #      0.3 - 1.0 K/uL    Eos #      0.0 - 0.5 K/uL    Baso #      0.00 - 0.20 K/uL    nRBC      0 /100 WBC    Gran %      38.0 - 73.0 %    Lymph %      18.0 - 48.0 %    Mono %      4.0 - 15.0 %    Eosinophil %      0.0 - 8.0 %    Basophil %      0.0 - 1.9 %    Differential Method    Sodium      136 - 145 mmol/L    Potassium      3.5 - 5.1 mmol/L    Chloride      95 - 110 mmol/L    CO2      22 - 31 mmol/L    Glucose      70 - 110 mg/dL    BUN      9 - 21 mg/dL    Creatinine       0.50 - 1.40 mg/dL    Calcium      8.4 - 10.2 mg/dL    PROTEIN TOTAL      6.0 - 8.4 g/dL    Albumin      3.5 - 5.2 g/dL    BILIRUBIN TOTAL      0.2 - 1.3 mg/dL    ALP      38 - 145 U/L    AST      17 - 59 U/L    ALT      0 - 50 U/L    Anion Gap      5 - 12 mmol/L    eGFR      >60 mL/min/1.73 m^2    HCV RNA Quant by NAAT      IU/mL    HCV RNA Log by NAAT      log IU/mL    HCV Quant by NAAT Interpretation      Not Detected     CCP Antibodies      <5.0 U/mL <0.5    Hep B C IgM Negative    Hep B S Ab      IU/L <3.10    Hepatitis B Surface Ag Negative    Hepatitis C Ab High positive !    Rheumatoid Factor      0.0 - 15.0 IU/mL 61.1 (H)    T-SPOT TB Screening Test See result image under hyperlink    Sed Rate      0 - 19 mm/Hr    CRP      0.00 - 0.90 mg/dL      Component      Latest Ref Rn 6/13/2023 8/21/2023   WBC      3.90 - 12.70 K/uL  9.94    RBC      4.60 - 6.20 M/uL  4.69    Hemoglobin      14.0 - 18.0 g/dL  14.1    Hematocrit      40.0 - 54.0 %  43.0    MCV      82 - 98 fL  92    MCH      27.0 - 31.0 pg  30.1    MCHC      32.0 - 36.0 g/dL  32.8    RDW      11.5 - 14.5 %  14.1    Platelet Count      150 - 450 K/uL  271    MPV      9.2 - 12.9 fL  11.8    Immature Granulocytes      0.0 - 0.5 %  0.3    Gran # (ANC)      1.8 - 7.7 K/uL  8.0 (H)    Immature Grans (Abs)      0.00 - 0.04 K/uL  0.03    Lymph #      1.0 - 4.8 K/uL  1.3    Mono #      0.3 - 1.0 K/uL  0.6    Eos #      0.0 - 0.5 K/uL  0.0    Baso #      0.00 - 0.20 K/uL  0.03    nRBC      0 /100 WBC  0    Gran %      38.0 - 73.0 %  80.7 (H)    Lymph %      18.0 - 48.0 %  12.6 (L)    Mono %      4.0 - 15.0 %  5.9    Eosinophil %      0.0 - 8.0 %  0.2    Basophil %      0.0 - 1.9 %  0.3    Differential Method  Automated    Sodium      136 - 145 mmol/L  137    Potassium      3.5 - 5.1 mmol/L  4.5    Chloride      95 - 110 mmol/L  103    CO2      22 - 31 mmol/L  27    Glucose      70 - 110 mg/dL  134 (H)    BUN      9 - 21 mg/dL  26 (H)    Creatinine      0.50 -  1.40 mg/dL  0.97    Calcium      8.4 - 10.2 mg/dL  8.9    PROTEIN TOTAL      6.0 - 8.4 g/dL  7.3    Albumin      3.5 - 5.2 g/dL  4.5    BILIRUBIN TOTAL      0.2 - 1.3 mg/dL  0.9    ALP      38 - 145 U/L  84    AST      17 - 59 U/L  27    ALT      0 - 50 U/L  25    Anion Gap      5 - 12 mmol/L  7    eGFR      >60 mL/min/1.73 m^2  >60    HCV RNA Quant by NAAT      IU/mL 3,508,971     HCV RNA Log by NAAT      log IU/mL 6.55     HCV Quant by NAAT Interpretation      Not Detected  Detected !     CCP Antibodies      <5.0 U/mL     Hep B C IgM     Hep B S Ab      IU/L     Hepatitis B Surface Ag     Hepatitis C Ab     Rheumatoid Factor      0.0 - 15.0 IU/mL     T-SPOT TB Screening Test     Sed Rate      0 - 19 mm/Hr 9     CRP      0.00 - 0.90 mg/dL 0.70        Assessment:       1. Seropositive rheumatoid arthritis    2. Acute hepatitis C virus infection without hepatic coma    3. Current chronic use of systemic steroids            Plan:       Seropositive rheumatoid arthritis  -     CBC Auto Differential; Future; Expected date: 10/13/2023  -     Comprehensive Metabolic Panel; Future; Expected date: 10/13/2023  -     C-Reactive Protein; Future; Expected date: 10/13/2023  -     Sedimentation rate; Future; Expected date: 10/13/2023    Acute hepatitis C virus infection without hepatic coma    Current chronic use of systemic steroids        Assessment:  58 year old male with  Seropositive Rheumatoid arthritis (+RF)  --cervical fusion Dr. Mcgrath  --chronic current steroid use  --hepatitis C on tx w/Hepatology  --failed plaquenil    Plan  Cont medrol prn for flares. Discussed s/e of daily prolonged steroid use.   If the arthritis resolves with successful treatment of the HCV infection, then the patient likely had HCV arthritis. However, HCV arthritis does not always remit with clearing of the HCV infection. Will consider treatment once patient is has been cleared by Hepatology and NSY (recent spinal fusion).     Follow up:  4  kyle garay/labs prior

## 2024-02-07 ENCOUNTER — TELEPHONE (OUTPATIENT)
Dept: RHEUMATOLOGY | Facility: CLINIC | Age: 59
End: 2024-02-07
Payer: MEDICARE

## 2024-02-07 NOTE — TELEPHONE ENCOUNTER
S/w pt's wife and informed her that there are lab orders in pt's chart that [facility] can view      Michelle Jacquet, MA [facility]  2/7/2024

## 2024-02-07 NOTE — TELEPHONE ENCOUNTER
----- Message from Aria Pearson sent at 2/7/2024 12:37 PM CST -----  Type: orders     Who Called:wife  Does the patient know what this is regarding?:would like to get lab work sent to [facility] outpt [place] please contact   Would the patient rather a call back or a response via [facility]? Call   Best Call Back Number:[phone]  Additional Information:

## 2024-02-21 ENCOUNTER — OFFICE VISIT (OUTPATIENT)
Dept: RHEUMATOLOGY | Facility: CLINIC | Age: 59
End: 2024-02-21
Payer: MEDICARE

## 2024-02-21 VITALS
BODY MASS INDEX: 27.29 KG/M2 | HEART RATE: 57 BPM | DIASTOLIC BLOOD PRESSURE: 88 MMHG | HEIGHT: 72 IN | WEIGHT: 201.5 LBS | SYSTOLIC BLOOD PRESSURE: 162 MMHG

## 2024-02-21 DIAGNOSIS — D84.821 DRUG-INDUCED IMMUNODEFICIENCY: ICD-10-CM

## 2024-02-21 DIAGNOSIS — M05.9 SEROPOSITIVE RHEUMATOID ARTHRITIS: ICD-10-CM

## 2024-02-21 DIAGNOSIS — Z79.899 DRUG-INDUCED IMMUNODEFICIENCY: ICD-10-CM

## 2024-02-21 DIAGNOSIS — G54.2 CERVICAL PLEXUS NEUROPATHY: ICD-10-CM

## 2024-02-21 DIAGNOSIS — M05.719 RHEUMATOID ARTHRITIS INVOLVING SHOULDER WITH POSITIVE RHEUMATOID FACTOR, UNSPECIFIED LATERALITY: Primary | ICD-10-CM

## 2024-02-21 DIAGNOSIS — Z79.52 CURRENT CHRONIC USE OF SYSTEMIC STEROIDS: ICD-10-CM

## 2024-02-21 DIAGNOSIS — N40.0 BENIGN PROSTATIC HYPERPLASIA, UNSPECIFIED WHETHER LOWER URINARY TRACT SYMPTOMS PRESENT: ICD-10-CM

## 2024-02-21 PROCEDURE — 99999 PR PBB SHADOW E&M-EST. PATIENT-LVL III: CPT | Mod: PBBFAC,,, | Performed by: INTERNAL MEDICINE

## 2024-02-21 PROCEDURE — 96372 THER/PROPH/DIAG INJ SC/IM: CPT | Mod: S$GLB,,, | Performed by: INTERNAL MEDICINE

## 2024-02-21 PROCEDURE — 99215 OFFICE O/P EST HI 40 MIN: CPT | Mod: 25,S$GLB,, | Performed by: INTERNAL MEDICINE

## 2024-02-21 RX ORDER — PREGABALIN 50 MG/1
50 CAPSULE ORAL 3 TIMES DAILY
Qty: 90 CAPSULE | Refills: 6 | Status: SHIPPED | OUTPATIENT
Start: 2024-02-21 | End: 2024-08-21

## 2024-02-21 RX ORDER — METHYLPREDNISOLONE ACETATE 80 MG/ML
160 INJECTION, SUSPENSION INTRA-ARTICULAR; INTRALESIONAL; INTRAMUSCULAR; SOFT TISSUE
Status: COMPLETED | OUTPATIENT
Start: 2024-02-21 | End: 2024-02-21

## 2024-02-21 RX ORDER — METHYLPREDNISOLONE 4 MG/1
8 TABLET ORAL DAILY PRN
Qty: 60 TABLET | Refills: 6 | Status: SHIPPED | OUTPATIENT
Start: 2024-02-21 | End: 2024-03-04

## 2024-02-21 RX ORDER — TAMSULOSIN HYDROCHLORIDE 0.4 MG/1
0.4 CAPSULE ORAL DAILY
Qty: 30 CAPSULE | Refills: 11 | Status: SHIPPED | OUTPATIENT
Start: 2024-02-21 | End: 2025-02-20

## 2024-02-21 RX ORDER — CYANOCOBALAMIN 1000 UG/ML
1000 INJECTION, SOLUTION INTRAMUSCULAR; SUBCUTANEOUS
Status: COMPLETED | OUTPATIENT
Start: 2024-02-21 | End: 2024-02-21

## 2024-02-21 RX ORDER — KETOROLAC TROMETHAMINE 30 MG/ML
60 INJECTION, SOLUTION INTRAMUSCULAR; INTRAVENOUS
Status: COMPLETED | OUTPATIENT
Start: 2024-02-21 | End: 2024-02-21

## 2024-02-21 RX ADMIN — CYANOCOBALAMIN 1000 MCG: 1000 INJECTION, SOLUTION INTRAMUSCULAR; SUBCUTANEOUS at 03:02

## 2024-02-21 RX ADMIN — METHYLPREDNISOLONE ACETATE 160 MG: 80 INJECTION, SUSPENSION INTRA-ARTICULAR; INTRALESIONAL; INTRAMUSCULAR; SOFT TISSUE at 03:02

## 2024-02-21 RX ADMIN — KETOROLAC TROMETHAMINE 60 MG: 30 INJECTION, SOLUTION INTRAMUSCULAR; INTRAVENOUS at 03:02

## 2024-02-21 ASSESSMENT — ROUTINE ASSESSMENT OF PATIENT INDEX DATA (RAPID3)
PATIENT GLOBAL ASSESSMENT SCORE: 6
PAIN SCORE: 8
TOTAL RAPID3 SCORE: 5.33
PSYCHOLOGICAL DISTRESS SCORE: 0
MDHAQ FUNCTION SCORE: 0.6
FATIGUE SCORE: 2.2

## 2024-02-21 NOTE — PROGRESS NOTES
Subjective:     Patient ID:  Lokesh Melchor    Chief Complaint:  Disease Management     History of Present Illness:  Follow up: 58 year old male who presents to telemedicine virtual visit for follow up on seropositive rheumatoid arthritis. On pre-biologic work up hep C+ and referred to Hepatology. He was started on treatment of Epclusa 8/11/23 for 12 week regimen. He also underwent multiple level cervical fusion approx 1 month ago with Dr. Mcgrath. He may need to  another surgery posterior approach.He had increased pain in his thoracic spine and started medrol dose pack per Neuro and then since it worked so well he started taking medrol 4 mg daily. He states previously he was taking medrol only PRN for flares. He feels daily medrol has helped significantly with his daily pain.      Current treatment:  1. Medrol     Prior treatment:  1. Plaquenil     Rheumatologic History:   - Diagnosis/es:  - Positive serologies:  - Infectious screening labs:  - Previous Treatments:  - Current Treatments:     Interval History:   Hospitalization since last office visit: No    Patient Active Problem List    Diagnosis Date Noted    Chronic hepatitis C 07/31/2023    Drug-induced immunodeficiency 06/02/2023    Rheumatoid arthritis involving shoulder with positive rheumatoid factor, unspecified laterality 06/02/2023    Opioid type dependence, continuous 06/10/2015    Sciatica associated with disorder of lumbar spine 06/10/2015    Knee arthropathy 06/10/2015     Past Surgical History:   Procedure Laterality Date    BACK SURGERY      x 3    CIRCUMCISION      KNEE SURGERY      MCL repair     Social History     Tobacco Use    Smoking status: Never    Smokeless tobacco: Never   Substance Use Topics    Alcohol use: Yes     Comment: seldom    Drug use: No     Comment: quit in 2007 went to rehab- addicted to opiates     Family History   Problem Relation Age of Onset    No Known Problems Mother     No Known Problems Father      No Known Problems Sister     No Known Problems Brother     Nephrolithiasis Neg Hx     Cancer Neg Hx      Review of patient's allergies indicates:   Allergen Reactions    Codeine Rash       Review of Systems   Review of Systems   Constitutional:  Positive for activity change and fatigue. Negative for appetite change, chills, diaphoresis, fever and unexpected weight change.   HENT:  Negative for congestion, ear pain, facial swelling, mouth sores, nosebleeds, postnasal drip, rhinorrhea, sinus pressure, sneezing, sore throat, tinnitus, trouble swallowing and voice change.    Eyes:  Negative for pain, discharge, redness, itching and visual disturbance.   Respiratory:  Negative for apnea, cough, chest tightness, shortness of breath and wheezing.    Cardiovascular:  Negative for chest pain, palpitations and leg swelling.   Gastrointestinal:  Negative for abdominal pain, constipation, diarrhea, nausea and vomiting.   Endocrine: Negative for cold intolerance, heat intolerance, polydipsia and polyuria.   Genitourinary:  Negative for decreased urine volume, difficulty urinating, dysuria, flank pain, frequency, hematuria and urgency.   Musculoskeletal:  Positive for back pain, joint swelling and neck stiffness. Negative for arthralgias, gait problem, myalgias and neck pain.   Skin:  Negative for pallor, rash and wound.   Allergic/Immunologic: Negative for immunocompromised state.   Neurological:  Negative for dizziness, tremors, seizures, syncope, weakness, numbness and headaches.   Hematological:  Negative for adenopathy. Does not bruise/bleed easily.   Psychiatric/Behavioral:  Negative for sleep disturbance and suicidal ideas. The patient is not nervous/anxious.         Current Medications:  Current Outpatient Medications   Medication Instructions    methylPREDNISolone (MEDROL) 8 mg, Oral, Daily PRN    pregabalin (LYRICA) 50 mg, Oral, 3 times daily    sofosbuvir-velpatasvir 400-100 mg Tab 1 tablet, Oral, Daily     "SUBOXONE 4-1 mg Film 1 Film, Buccal, Daily    tamsulosin (FLOMAX) 0.4 mg, Oral, Daily         Objective:     Vitals:    02/21/24 1337   BP: (!) 162/88   Pulse: (!) 57   Weight: 91.4 kg (201 lb 8 oz)   Height: 6' 0.01" (1.829 m)   PainSc:   4      Body mass index is 27.32 kg/m².     Physical Examinations:  Physical Exam   Constitutional: He is oriented to person, place, and time. No distress.   HENT:   Head: Normocephalic and atraumatic.   Mouth/Throat: Oropharynx is clear and moist.   Eyes: Pupils are equal, round, and reactive to light.   Neck: No thyromegaly present.   Cardiovascular: Normal rate, regular rhythm and normal heart sounds. Exam reveals no gallop and no friction rub.   No murmur heard.  Pulmonary/Chest: Breath sounds normal. He has no wheezes. He has no rales. He exhibits no tenderness.   Abdominal: There is no abdominal tenderness. There is no rebound and no guarding.   Musculoskeletal:         General: Tenderness and deformity present.      Right shoulder: Tenderness present.      Left shoulder: Tenderness present.      Right elbow: Tenderness present.      Left elbow: Normal.      Left wrist: Normal.      Cervical back: Neck supple.      Right knee: Swelling and effusion present.      Left knee: Swelling and effusion present.   Lymphadenopathy:     He has no cervical adenopathy.   Neurological: He is alert and oriented to person, place, and time. He displays weakness. He exhibits abnormal muscle tone.   Reflex Scores:       Patellar reflexes are 2+ on the right side and 2+ on the left side.  Skin: No rash noted. No erythema. No pallor.   Psychiatric: Mood and affect normal.   Vitals reviewed.      Right Side Rheumatological Exam     Examination finds the 2nd MCP, 3rd MCP, 4th MCP and 5th MCP normal.    The patient is tender to palpation of the shoulder and elbow    He has swelling of the knee    The patient has an enlarged wrist, 1st PIP, 2nd PIP, 3rd PIP, 4th PIP and 5th PIP    Shoulder Exam "   Tenderness Location: acromioclavicular joint and posterior shoulder    Range of Motion   Active abduction:  abnormal   Adduction: abnormal  Sensation: normal    Knee Exam     Range of Motion   Extension:  normal   Flexion:  normal   Patellofemoral Crepitus: positive  Effusion: positive  Sensation: normal    Hip Exam   Tenderness Location: anterior and posterior    Range of Motion   Extension:  abnormal   Flexion:  abnormal   Sensation: normal    Elbow/Wrist Exam   Tenderness Location: no tenderness    Range of Motion   Elbow   Flexion:  normal   Sensation: normal    Muscle Strength (0-5 scale):  Neck Flexion:  3  Neck Extension: 3  Deltoid:  3  Biceps: 3/5   Triceps:  3  Quadriceps:  3   Distal Lower Extremity: 3    Left Side Rheumatological Exam     Examination finds the elbow, wrist, 1st MCP, 2nd MCP, 3rd MCP, 4th MCP and 5th MCP normal.    The patient is tender to palpation of the shoulder.    He has swelling of the knee    The patient has an enlarged 1st PIP, 2nd PIP, 3rd PIP, 4th PIP and 5th PIP.    Shoulder Exam   Tenderness Location: acromioclavicular joint and posterior shoulder    Range of Motion   Active abduction:  abnormal   Extension:  abnormal   Sensation: normal    Knee Exam     Range of Motion   Extension:  normal   Flexion:  normal     Patellofemoral Crepitus: positive  Effusion: positive  Sensation: normal    Hip Exam   Tenderness Location: anterior and posterior    Range of Motion   Extension:  abnormal   Flexion:  abnormal   Sensation: normal    Elbow/Wrist Exam     Range of Motion   Elbow   Flexion:  normal   Sensation: normal    Muscle Strength (0-5 scale):  Neck Flexion:  3  Neck Extension: 3  Deltoid:  3  Biceps: 3/5   Triceps:  3  Quadriceps:  3   Distal Lower Extremity: 3      Back/Neck Exam   General Inspection   Gait: normal       Tenderness Right paramedian tenderness of the Lower C-Spine, Lower L-Spine, Upper C-Spine, Upper L-Spine and SI Joint.Left paramedian tenderness of the Lower  C-Spine, Lower L-Spine, Upper C-Spine, Upper L-Spine and SI Joint.    Back Range of Motion   Extension:  abnormal  Flexion:  abnormal  Lateral Bend Right:  abnormal  Lateral Bend Left:  abnormal  Rotation Right:  abnormal  Rotation Left:  abnormal    Neck Range of Motion   Flexion:  Limited and moderate  Extension:  Limited and moderate  Right Lateral Bend: abnormal  Left Lateral Bend: abnormal  Right Rotation: abnormal  Left Rotation: abnormal       Disease Assessment Scores:  Patient's Global Assessment of arthritis (0-10): 4  Physician's Global Assessment of arthritis (0-10): 6  Number of Tender Joints (0-28): 12  Number of Swollen Joints (0-28): 12    There is currently no information documented on the homunculus. Go to the Rheumatology activity and complete the homunculus joint exam.         10/10/2023     3:46 PM   Rapid3 Question Responses and Scores   MDHAQ Score 0.8   Psychologic Score 1.1   Pain Score 7   When you awakened in the morning OVER THE LAST WEEK, did you feel stiff? Yes   If Yes, please indicate the number of hours until you are as limber as you will be for the day 2   Fatigue Score 5   Global Health Score 5.5   RAPID3 Score 5.06       Monitoring Lab Results:  Lab Results   Component Value Date    WBC 7.67 02/15/2024    RBC 5.16 02/15/2024    HGB 15.3 02/15/2024    HCT 46.9 02/15/2024    MCV 91 02/15/2024    MCH 29.7 02/15/2024    MCHC 32.6 02/15/2024    RDW 13.4 02/15/2024     02/15/2024        Lab Results   Component Value Date     02/15/2024    K 4.2 02/15/2024     02/15/2024    CO2 29 02/15/2024     (H) 02/15/2024    BUN 18 02/15/2024    CREATININE 0.97 02/15/2024    CALCIUM 9.2 02/15/2024    PROT 7.2 02/15/2024    ALBUMIN 4.5 02/15/2024    BILITOT 1.2 02/15/2024    ALKPHOS 88 02/15/2024    AST 32 02/15/2024    ALT 32 02/15/2024    ANIONGAP 7 02/15/2024    EGFRNORACEVR >60 02/15/2024       Lab Results   Component Value Date    SEDRATE 10 02/15/2024    CRP <0.50  "02/15/2024        No results found for: "VOXAMURN86LT", "ALJZIFIL59"     Lab Results   Component Value Date    CHOL 238 (H) 02/15/2024    HDL 75 02/15/2024    LDLCALC 141.4 02/15/2024    TRIG 108 02/15/2024       Lab Results   Component Value Date    RF 61.1 (H) 05/30/2023    CCPANTIBODIE <0.5 05/30/2023     No results found for: "ANASCREEN", "ANATITER", "ANAPATTE", "DSDNA", "SMRNPAB", "SSAANTIBODY", "SSBANTIBODY", "CPJ91ZN", "JO1AB"  No results found for: "HLABB27"    Infectious Disease Screening:  Lab Results   Component Value Date    HEPBSAG Negative 05/30/2023    HEPBCAB Non-reactive 07/31/2023    HEPBSAB <3.10 05/30/2023    HEPBIGM Negative 05/30/2023     Lab Results   Component Value Date    HEPCAB High positive (A) 05/30/2023     No results found for: "TBGOLDPLUS", "QUANTTBGDPL"  No results found for: "QUANTIFERON", "SVCMT", "QUANTAGVALUE", "QUANTNILVALU", "QUANTMITOGEN", "QFTTBAG", "QINT"     Imaging: DEXA, Xrays, MRIs, CTs, etc    Old & Outside Medical Records:  Reviewed old and all outside medical records available in Care Everywhere    Current Medication Changes:  Medication List with Changes/Refills   New Medications    PREGABALIN (LYRICA) 50 MG CAPSULE    Take 1 capsule (50 mg total) by mouth 3 (three) times daily.    TAMSULOSIN (FLOMAX) 0.4 MG CAP    Take 1 capsule (0.4 mg total) by mouth once daily.   Current Medications    SOFOSBUVIR-VELPATASVIR 400-100 MG TAB    Take 1 tablet by mouth once daily.    SUBOXONE 4-1 MG FILM    Place 1 Film inside cheek once daily.    Changed and/or Refilled Medications    Modified Medication Previous Medication    METHYLPREDNISOLONE (MEDROL) 4 MG TAB methylPREDNISolone (MEDROL) 4 MG Tab       Take 2 tablets (8 mg total) by mouth daily as needed (flare).    Take by mouth.        Assessment:     Pt is a 58 y.o. male with rheumatoid arthritis. The patient has not achieved clinical remission. Plan is to continue hydroxychloroquine (PLAQUENIL)      Plan:      Lokesh was seen " today for disease management.    Diagnoses and all orders for this visit:    Rheumatoid arthritis involving shoulder with positive rheumatoid factor, unspecified laterality  -     T4, Free; Future  -     TSH; Future  -     Vitamin D; Future  -     Cyclic Citrullinated Peptide Antibody, IgG; Future  -     Rheumatoid Factor; Future  -     Sedimentation rate; Future  -     C-Reactive Protein; Future  -     Comprehensive Metabolic Panel; Future  -     CBC Auto Differential; Future  -     ketorolac injection 60 mg  -     methylPREDNISolone acetate injection 160 mg  -     cyanocobalamin injection 1,000 mcg  -     methylPREDNISolone (MEDROL) 4 MG Tab; Take 2 tablets (8 mg total) by mouth daily as needed (flare).    Seropositive rheumatoid arthritis  -     T4, Free; Future  -     TSH; Future  -     Vitamin D; Future  -     Cyclic Citrullinated Peptide Antibody, IgG; Future  -     Rheumatoid Factor; Future  -     Sedimentation rate; Future  -     C-Reactive Protein; Future  -     Comprehensive Metabolic Panel; Future  -     CBC Auto Differential; Future  -     ketorolac injection 60 mg  -     methylPREDNISolone acetate injection 160 mg  -     cyanocobalamin injection 1,000 mcg  -     methylPREDNISolone (MEDROL) 4 MG Tab; Take 2 tablets (8 mg total) by mouth daily as needed (flare).    Current chronic use of systemic steroids  -     T4, Free; Future  -     TSH; Future  -     Vitamin D; Future  -     Cyclic Citrullinated Peptide Antibody, IgG; Future  -     Rheumatoid Factor; Future  -     Sedimentation rate; Future  -     C-Reactive Protein; Future  -     Comprehensive Metabolic Panel; Future  -     CBC Auto Differential; Future  -     ketorolac injection 60 mg  -     methylPREDNISolone acetate injection 160 mg  -     cyanocobalamin injection 1,000 mcg  -     methylPREDNISolone (MEDROL) 4 MG Tab; Take 2 tablets (8 mg total) by mouth daily as needed (flare).    Cervical plexus neuropathy  -     pregabalin (LYRICA) 50 MG  capsule; Take 1 capsule (50 mg total) by mouth 3 (three) times daily.  -     T4, Free; Future  -     TSH; Future  -     Vitamin D; Future  -     Cyclic Citrullinated Peptide Antibody, IgG; Future  -     Rheumatoid Factor; Future  -     Sedimentation rate; Future  -     C-Reactive Protein; Future  -     Comprehensive Metabolic Panel; Future  -     CBC Auto Differential; Future  -     ketorolac injection 60 mg  -     methylPREDNISolone acetate injection 160 mg  -     cyanocobalamin injection 1,000 mcg  -     methylPREDNISolone (MEDROL) 4 MG Tab; Take 2 tablets (8 mg total) by mouth daily as needed (flare).    Drug-induced immunodeficiency  -     T4, Free; Future  -     TSH; Future  -     Vitamin D; Future  -     Cyclic Citrullinated Peptide Antibody, IgG; Future  -     Rheumatoid Factor; Future  -     Sedimentation rate; Future  -     C-Reactive Protein; Future  -     Comprehensive Metabolic Panel; Future  -     CBC Auto Differential; Future  -     ketorolac injection 60 mg  -     methylPREDNISolone acetate injection 160 mg  -     cyanocobalamin injection 1,000 mcg  -     methylPREDNISolone (MEDROL) 4 MG Tab; Take 2 tablets (8 mg total) by mouth daily as needed (flare).    Benign prostatic hyperplasia, unspecified whether lower urinary tract symptoms present  -     tamsulosin (FLOMAX) 0.4 mg Cap; Take 1 capsule (0.4 mg total) by mouth once daily.  -     Ambulatory referral/consult to Urology; Future  -     T4, Free; Future  -     TSH; Future  -     Vitamin D; Future  -     Cyclic Citrullinated Peptide Antibody, IgG; Future  -     Rheumatoid Factor; Future  -     Sedimentation rate; Future  -     C-Reactive Protein; Future  -     Comprehensive Metabolic Panel; Future  -     CBC Auto Differential; Future  -     ketorolac injection 60 mg  -     methylPREDNISolone acetate injection 160 mg  -     cyanocobalamin injection 1,000 mcg      Add medrol 4 mg 4 to 5 times weekly  Adding flomax and refer to sleep  Adding  lyrica  Nurseinjections  After all surgeries consider tyree 1    More than 50% of the  40 minute encounter was spent face to face counseling the patient regarding current status and future plan of care as well as side effects  of the medications. All questions were answered to patient's satisfaction also includes  non-face to face time preparing to see the patient (eg, review of tests), Obtaining and/or reviewing separately obtained history, Documenting clinical information in the electronic or other health record, Independently interpreting results

## 2024-02-28 DIAGNOSIS — M05.719 RHEUMATOID ARTHRITIS INVOLVING SHOULDER WITH POSITIVE RHEUMATOID FACTOR, UNSPECIFIED LATERALITY: ICD-10-CM

## 2024-02-28 DIAGNOSIS — M05.9 SEROPOSITIVE RHEUMATOID ARTHRITIS: ICD-10-CM

## 2024-02-28 DIAGNOSIS — G54.2 CERVICAL PLEXUS NEUROPATHY: ICD-10-CM

## 2024-02-28 DIAGNOSIS — Z79.899 DRUG-INDUCED IMMUNODEFICIENCY: ICD-10-CM

## 2024-02-28 DIAGNOSIS — D84.821 DRUG-INDUCED IMMUNODEFICIENCY: ICD-10-CM

## 2024-02-28 DIAGNOSIS — Z79.52 CURRENT CHRONIC USE OF SYSTEMIC STEROIDS: ICD-10-CM

## 2024-03-04 RX ORDER — METHYLPREDNISOLONE 4 MG/1
4 TABLET ORAL
Qty: 30 TABLET | Refills: 7 | Status: SHIPPED | OUTPATIENT
Start: 2024-03-04

## 2024-03-14 ENCOUNTER — TELEPHONE (OUTPATIENT)
Dept: HEPATOLOGY | Facility: CLINIC | Age: 59
End: 2024-03-14
Payer: MEDICARE

## 2024-03-14 NOTE — LETTER
March 14, 2024    Lokesh Melchor  [address] [address] [address]             Hepatology Clinic - [facility]  [address]  [place] [address]  Phone: [phone] Dear Mr. Melchor:    According to our records, you were prescribed treatment for Hepatitis C in 2023. It is important to know that taking all of the medicine does NOT mean you are cured. The virus can return after treatment so you STILL NEED MORE BLOOD WORK to know if your virus is cured. According to our records, these labs have not been done.       We have tried to reach you by phone to schedule these labs without success. Your health is important to us. Please call our office at [phone] so we can schedule these labs.        Sincerely,      Scheuermann, Jennifer B., PA   [facility]

## 2024-03-14 NOTE — TELEPHONE ENCOUNTER
----- Message from Columba Alonso RN sent at 3/14/2024 10:25 AM CDT -----  No advanced fibrosis.  Epclusa X 12.  No show for svr 12 draw ordered 2/2/24.  Multiple attempts made to coordinate.  Okay to close episode?

## 2024-03-22 ENCOUNTER — TELEPHONE (OUTPATIENT)
Dept: HEPATOLOGY | Facility: CLINIC | Age: 59
End: 2024-03-22
Payer: MEDICARE

## 2024-03-22 NOTE — TELEPHONE ENCOUNTER
----- Message from Eli Koehler RN sent at 3/22/2024  3:37 PM CDT -----  Regarding: FW: Scheduling Request    ----- Message -----  From: Barbara Olmstead  Sent: 3/22/2024   1:15 PM CDT  To: [facility] Clinical Staff  Subject: Scheduling Request                                     Name Of Caller:    Amber (Pt's Wife)       Contact Preference:    [phone]      Nature of call:    Pt's Wife would like to schedule her 's upcoming labs at [facility]. Please call back to discuss his availability.

## 2024-03-26 ENCOUNTER — LAB VISIT (OUTPATIENT)
Dept: LAB | Facility: HOSPITAL | Age: 59
End: 2024-03-26
Attending: PHYSICIAN ASSISTANT
Payer: MEDICARE

## 2024-03-26 DIAGNOSIS — B18.2 CHRONIC HEPATITIS C WITHOUT HEPATIC COMA: ICD-10-CM

## 2024-03-26 PROCEDURE — 36415 COLL VENOUS BLD VENIPUNCTURE: CPT | Mod: PO | Performed by: PHYSICIAN ASSISTANT

## 2024-03-26 PROCEDURE — 87522 HEPATITIS C REVRS TRNSCRPJ: CPT | Performed by: PHYSICIAN ASSISTANT

## 2024-03-27 LAB
HCV RNA SERPL QL NAA+PROBE: NOT DETECTED
HCV RNA SPEC NAA+PROBE-ACNC: NOT DETECTED IU/ML

## 2024-03-28 ENCOUNTER — TELEPHONE (OUTPATIENT)
Dept: HEPATOLOGY | Facility: CLINIC | Age: 59
End: 2024-03-28
Payer: MEDICARE

## 2024-03-28 DIAGNOSIS — Z86.19 HEPATITIS C VIRUS INFECTION CURED AFTER ANTIVIRAL DRUG THERAPY: Primary | ICD-10-CM

## 2024-03-28 PROBLEM — B18.2 CHRONIC HEPATITIS C: Status: RESOLVED | Noted: 2023-07-31 | Resolved: 2024-03-28

## 2024-03-28 NOTE — TELEPHONE ENCOUNTER
Pt began 12 weeks of Epclusa on 8/11/23  Anticipated treatment end date: 11/2/23  F 0-1  Mally 1a/b  Treatment naive    Never had 2/2023 SVR labs    These labs document SVR12 following successful HCV treatment with Epclusa    please tell patient:  Lab test shows there is NO Hepatitis C in the blood. This means the Hepatitis C is cured!!  We do not expect the virus to return.  This does not give protection from Hepatitis C and patient could be infected again if ever exposed to the virus again.

## 2024-03-28 NOTE — TELEPHONE ENCOUNTER
I spoke with patient's wife and msg from PA Scheuermann relayed.  She states that info will be passed on to her .

## 2024-04-23 ENCOUNTER — TELEPHONE (OUTPATIENT)
Dept: FAMILY MEDICINE | Facility: CLINIC | Age: 59
End: 2024-04-23
Payer: MEDICARE

## 2024-04-23 NOTE — TELEPHONE ENCOUNTER
----- Message from Elena Bates sent at 4/23/2024  1:37 PM CDT -----  Contact: pt  Type: Needs Medical Advice         Who Called: pt   Best Call Back Number:[phone]  Additional Information: Requesting a call back regarding  pt is needing to find a new pcp that will proscribe him SUBOXONE 4-1 mg Film. Pt asking if office will  do that.    Please Advise- Thank you

## 2024-04-23 NOTE — TELEPHONE ENCOUNTER
Called pt, scheduled appointment for suboxone Rx for 5/13/24 with Dr. Recio. Pt verbalized understanding.

## 2024-09-02 DIAGNOSIS — G54.2 CERVICAL PLEXUS NEUROPATHY: ICD-10-CM

## 2024-09-09 RX ORDER — PREGABALIN 50 MG/1
50 CAPSULE ORAL 3 TIMES DAILY
Qty: 90 CAPSULE | Refills: 5 | Status: SHIPPED | OUTPATIENT
Start: 2024-09-09 | End: 2025-03-10

## 2025-03-15 DIAGNOSIS — G54.2 CERVICAL PLEXUS NEUROPATHY: ICD-10-CM

## 2025-03-15 DIAGNOSIS — D84.821 DRUG-INDUCED IMMUNODEFICIENCY: ICD-10-CM

## 2025-03-15 DIAGNOSIS — M05.9 SEROPOSITIVE RHEUMATOID ARTHRITIS: ICD-10-CM

## 2025-03-15 DIAGNOSIS — M05.719 RHEUMATOID ARTHRITIS INVOLVING SHOULDER WITH POSITIVE RHEUMATOID FACTOR, UNSPECIFIED LATERALITY: ICD-10-CM

## 2025-03-15 DIAGNOSIS — Z79.899 DRUG-INDUCED IMMUNODEFICIENCY: ICD-10-CM

## 2025-03-15 DIAGNOSIS — Z79.52 CURRENT CHRONIC USE OF SYSTEMIC STEROIDS: ICD-10-CM

## 2025-03-18 RX ORDER — METHYLPREDNISOLONE 4 MG/1
4 TABLET ORAL
Qty: 30 TABLET | Refills: 7 | Status: SHIPPED | OUTPATIENT
Start: 2025-03-18